# Patient Record
Sex: MALE | Race: OTHER | NOT HISPANIC OR LATINO | URBAN - METROPOLITAN AREA
[De-identification: names, ages, dates, MRNs, and addresses within clinical notes are randomized per-mention and may not be internally consistent; named-entity substitution may affect disease eponyms.]

---

## 2024-06-11 ENCOUNTER — TELEPHONE (OUTPATIENT)
Dept: UROLOGY | Facility: CLINIC | Age: 59
End: 2024-06-11

## 2024-06-11 ENCOUNTER — OFFICE VISIT (OUTPATIENT)
Dept: UROLOGY | Facility: CLINIC | Age: 59
End: 2024-06-11
Payer: COMMERCIAL

## 2024-06-11 VITALS
WEIGHT: 250 LBS | HEIGHT: 70 IN | SYSTOLIC BLOOD PRESSURE: 123 MMHG | BODY MASS INDEX: 35.79 KG/M2 | HEART RATE: 85 BPM | DIASTOLIC BLOOD PRESSURE: 79 MMHG

## 2024-06-11 DIAGNOSIS — N28.89 RENAL MASS: Primary | ICD-10-CM

## 2024-06-11 LAB
BILIRUBIN, UA POC OHS: NEGATIVE
BLOOD, UA POC OHS: ABNORMAL
CLARITY, UA POC OHS: ABNORMAL
COLOR, UA POC OHS: ABNORMAL
GLUCOSE, UA POC OHS: >=1000
KETONES, UA POC OHS: NEGATIVE
LEUKOCYTES, UA POC OHS: NEGATIVE
NITRITE, UA POC OHS: NEGATIVE
PH, UA POC OHS: 5.5
PROTEIN, UA POC OHS: 100
SPECIFIC GRAVITY, UA POC OHS: 1.02
UROBILINOGEN, UA POC OHS: 0.2

## 2024-06-11 PROCEDURE — 99205 OFFICE O/P NEW HI 60 MIN: CPT | Mod: S$GLB,,, | Performed by: UROLOGY

## 2024-06-11 PROCEDURE — 81003 URINALYSIS AUTO W/O SCOPE: CPT | Mod: QW,S$GLB,, | Performed by: UROLOGY

## 2024-06-11 RX ORDER — TIRZEPATIDE 10 MG/.5ML
INJECTION, SOLUTION SUBCUTANEOUS
COMMUNITY
Start: 2024-04-08

## 2024-06-11 RX ORDER — HYDROCODONE BITARTRATE AND ACETAMINOPHEN 7.5; 325 MG/1; MG/1
1 TABLET ORAL EVERY 6 HOURS PRN
Qty: 10 TABLET | Refills: 0 | Status: SHIPPED | OUTPATIENT
Start: 2024-06-11

## 2024-06-11 RX ORDER — CLOPIDOGREL BISULFATE 75 MG/1
75 TABLET ORAL
COMMUNITY
Start: 2024-01-27

## 2024-06-11 RX ORDER — METOPROLOL SUCCINATE 50 MG/1
TABLET, EXTENDED RELEASE ORAL
COMMUNITY
Start: 2024-05-30

## 2024-06-11 RX ORDER — GLIPIZIDE 10 MG/1
10 TABLET ORAL 2 TIMES DAILY
COMMUNITY
Start: 2024-05-17

## 2024-06-11 NOTE — PROGRESS NOTES
Subjective:       Patient ID: Cecil Martinez is a 58 y.o. male.    Chief Complaint: Nephrolithiasis      HPI: 59y/o male with a 2cm left renal stone incidentally found to have a 4cm left renal mass.  Seen at [facility], stent was placed for obstructing stone.  Patient was referred here for further management and evaluation.    Past Medical History:   Past Medical History:   Diagnosis Date    Clotting disorder     Heart attack 2015    Kidney stone     Type 2 diabetes mellitus without complications        Past Surgical Historical:   Past Surgical History:   Procedure Laterality Date    Left URS          Medications:   Medication List with Changes/Refills   Current Medications    CLOPIDOGREL (PLAVIX) 75 MG TABLET    Take 75 mg by mouth.    GLIPIZIDE (GLUCOTROL) 10 MG TABLET    Take 10 mg by mouth 2 (two) times daily.    METOPROLOL SUCCINATE (TOPROL-XL) 50 MG 24 HR TABLET    TAKE ONE TABLET BY MOUTH ONCE DAILY. STOP ATENOLOL    MOUNJARO 10 MG/0.5 ML PNIJ            Past Social History:   Social History     Socioeconomic History    Marital status:    Tobacco Use    Smoking status: Former     Types: Cigarettes    Smokeless tobacco: Former       Allergies: Review of patient's allergies indicates:  No Known Allergies     Family History:   Family History   Problem Relation Name Age of Onset    Cancer Mother          Breast        Review of Systems:  Review of Systems   Constitutional:  Negative for activity change and appetite change.   HENT:  Negative for congestion and dental problem.    Eyes:  Negative for visual disturbance.   Respiratory:  Negative for chest tightness and shortness of breath.    Cardiovascular:  Negative for chest pain.   Gastrointestinal:  Negative for abdominal distention and abdominal pain.   Genitourinary:  Negative for decreased urine volume, difficulty urinating, dysuria, enuresis, flank pain, frequency, genital sores, hematuria, penile discharge, penile pain, penile swelling,  scrotal swelling, testicular pain and urgency.   Musculoskeletal:  Negative for back pain and neck pain.   Skin:  Negative for color change.   Neurological:  Negative for dizziness.   Hematological:  Negative for adenopathy.   Psychiatric/Behavioral:  Negative for agitation, behavioral problems and confusion.        Physical Exam:  Physical Exam  Constitutional:       General: He is not in acute distress.     Appearance: He is well-developed.   HENT:      Head: Normocephalic and atraumatic.      Nose: Nose normal.   Eyes:      General: No scleral icterus.     Conjunctiva/sclera: Conjunctivae normal.      Pupils: Pupils are equal, round, and reactive to light.   Neck:      Thyroid: No thyromegaly.      Trachea: No tracheal deviation.   Cardiovascular:      Rate and Rhythm: Normal rate and regular rhythm.      Heart sounds: Normal heart sounds.   Pulmonary:      Effort: Pulmonary effort is normal. No respiratory distress.      Breath sounds: Normal breath sounds. No wheezing or rales.   Abdominal:      General: Bowel sounds are normal. There is no distension.      Palpations: Abdomen is soft.      Tenderness: There is no abdominal tenderness. There is no guarding or rebound.   Genitourinary:     Penis: Normal. No tenderness.       Prostate: Normal.   Musculoskeletal:         General: No deformity. Normal range of motion.      Cervical back: Neck supple.   Lymphadenopathy:      Cervical: No cervical adenopathy.   Skin:     General: Skin is warm and dry.      Findings: No erythema or rash.   Neurological:      Mental Status: He is alert and oriented to person, place, and time.      Cranial Nerves: No cranial nerve deficit.   Psychiatric:         Behavior: Behavior normal.         Assessment/Plan:       Problem List Items Addressed This Visit    None  Visit Diagnoses       Renal mass    -  Primary               58-year-old male with a 2 cm stone left ureteral stent placed has has a 4 cm left renal mass we had a long  discussion about the options for treatment he would like radical nephrectomy to reduce his risk of bleeding and tumor spread we will proceed to the operating room after gets medical clearance for left hand assist radical nephrectomy

## 2024-06-11 NOTE — TELEPHONE ENCOUNTER
Spoke with pt and informed him I would speak with provider about pain medication.    ----- Message from Idalia Rea sent at 6/11/2024  2:25 PM CDT -----  Contact: Jeanne - girlfriend  Patient is requesting a call back regarding pain medication. Please call back at [phone]

## 2024-06-12 DIAGNOSIS — N28.89 RENAL MASS: Primary | ICD-10-CM

## 2024-06-21 ENCOUNTER — CLINICAL SUPPORT (OUTPATIENT)
Dept: UROLOGY | Facility: CLINIC | Age: 59
End: 2024-06-21
Payer: COMMERCIAL

## 2024-06-21 ENCOUNTER — TELEPHONE (OUTPATIENT)
Dept: UROLOGY | Facility: CLINIC | Age: 59
End: 2024-06-21

## 2024-06-21 DIAGNOSIS — N28.89 RENAL MASS: Primary | ICD-10-CM

## 2024-06-21 RX ORDER — TIRZEPATIDE 12.5 MG/.5ML
12.5 INJECTION, SOLUTION SUBCUTANEOUS WEEKLY
COMMUNITY
Start: 2024-06-16

## 2024-06-21 RX ORDER — DAPAGLIFLOZIN 10 MG/1
10 TABLET, FILM COATED ORAL DAILY
COMMUNITY

## 2024-06-21 RX ORDER — ATORVASTATIN CALCIUM 40 MG/1
40 TABLET, FILM COATED ORAL DAILY
COMMUNITY

## 2024-06-21 RX ORDER — ASPIRIN 325 MG
325 TABLET, DELAYED RELEASE (ENTERIC COATED) ORAL DAILY
COMMUNITY

## 2024-06-21 RX ORDER — LISINOPRIL AND HYDROCHLOROTHIAZIDE 10; 12.5 MG/1; MG/1
1 TABLET ORAL DAILY
COMMUNITY

## 2024-06-21 RX ORDER — MULTIVITAMIN
1 TABLET ORAL DAILY
COMMUNITY

## 2024-06-21 NOTE — TELEPHONE ENCOUNTER
Returned request for call back. Informed Mr. Martinez that the nurse practitioner  that was here today did not feel comfortable sending out any pain medication because she has never seen him as a patient. Instructed patient that he could call back on Monday when Dr. Bay returns from vacation & ask for pain medication refill at that time. Verbalized understanding.                           ----- Message from Sofie Powell sent at 6/21/2024  3:38 PM CDT -----  Name of Who is Calling:pt           What is the request in detail:returning missed call           Can the clinic reply by [facility]:no           What Number to Call Back if not in [facility]: [phone]

## 2024-06-21 NOTE — PROGRESS NOTES
Patient & girlfriend here for left radical nephrectomy & left stent DC education & to sign consents. Educated them on both procedures including risks & alternatives. Pre-op instructions reviewed with patient as well as bowel prep instructions & copies of both given. Copy of pre-op lab orders & updated med list given to patient as well. Copies also faxed to surgery. Questions answered, verbalized understanding & consents signed. Patient inquired about getting another script for pain medication. Instructed him that I would ask & let him know.

## 2024-06-21 NOTE — TELEPHONE ENCOUNTER
Attempted to call patient  to inform him that pain medication would not be called out at this time. No answer, no voicemail to leave message. Will attempt to call again shortly.

## 2024-06-24 LAB
ANION GAP SERPL CALC-SCNC: 8 MMOL/L (ref 3–11)
APPEARANCE, UA: ABNORMAL
APTT PPP: 33.8 SEC (ref 25.2–38.7)
BACTERIA SPEC CULT: ABNORMAL /HPF
BASOPHILS NFR BLD: 0.8 % (ref 0–3)
BILIRUB UR QL STRIP: NEGATIVE MG/DL
BUN SERPL-MCNC: 12 MG/DL (ref 7–18)
BUN/CREAT SERPL: 13.48 RATIO (ref 7–18)
CALCIUM SERPL-MCNC: 8.9 MG/DL (ref 8.8–10.5)
CHLORIDE SERPL-SCNC: 100 MMOL/L (ref 100–108)
CO2 SERPL-SCNC: 27 MMOL/L (ref 21–32)
COLOR UR: ABNORMAL
CREAT SERPL-MCNC: 0.89 MG/DL (ref 0.7–1.3)
EOSINOPHIL NFR BLD: 5 % (ref 1–3)
ERYTHROCYTE [DISTWIDTH] IN BLOOD BY AUTOMATED COUNT: 12.9 % (ref 12.5–18)
GFR ESTIMATION: > 60
GLUCOSE (UA): NORMAL MG/DL
GLUCOSE SERPL-MCNC: 209 MG/DL (ref 70–110)
HCT VFR BLD AUTO: 43.7 % (ref 42–52)
HGB BLD-MCNC: 15.3 G/DL (ref 14–18)
HGB UR QL STRIP: 150 /UL
INR PPP: 1 INR (ref 0.9–1.1)
KETONES UR QL STRIP: NEGATIVE MG/DL
LEUKOCYTE ESTERASE UR QL STRIP: 500 /UL
LYMPHOCYTES NFR BLD: 31.1 % (ref 25–40)
MCH RBC QN AUTO: 30.5 PG (ref 27–31.2)
MCHC RBC AUTO-ENTMCNC: 35 G/DL (ref 31.8–35.4)
MCV RBC AUTO: 87.1 FL (ref 80–97)
MONOCYTES NFR BLD: 7.2 % (ref 1–15)
MUCUS URINE: ABNORMAL /LPF
NEUTROPHILS # BLD AUTO: 5.07 10*3/UL (ref 1.8–7.7)
NEUTROPHILS NFR BLD: 55.7 % (ref 37–80)
NITRITE UR QL STRIP: NEGATIVE
NUCLEATED RED BLOOD CELLS: 0 %
PH UR STRIP: 5 PH (ref 5–9)
PLATELETS: 195 10*3/UL (ref 142–424)
POTASSIUM SERPL-SCNC: 4.4 MMOL/L (ref 3.6–5.2)
PROT UR QL STRIP: 100 MG/DL
PROTHROMBIN TIME: 11.4 SEC (ref 10.4–13.2)
RBC # BLD AUTO: 5.02 10*6/UL (ref 4.7–6.1)
RBC #/AREA URNS HPF: ABNORMAL /HPF (ref 0–2)
SERVICE COMMENT 03: ABNORMAL
SODIUM BLD-SCNC: 135 MMOL/L (ref 135–145)
SP GR UR STRIP: 1.02 (ref 1–1.03)
SPECIMEN COLLECTION METHOD, URINE: ABNORMAL
SQUAMOUS EPITHELIAL, UA: ABNORMAL /LPF
UROBILINOGEN UR STRIP-ACNC: NORMAL MG/DL
WBC # BLD: 9.1 10*3/UL (ref 4.6–10.2)
WBC #/AREA URNS HPF: ABNORMAL /HPF (ref 0–5)

## 2024-06-25 DIAGNOSIS — N28.89 RENAL MASS: ICD-10-CM

## 2024-06-25 RX ORDER — HYDROCODONE BITARTRATE AND ACETAMINOPHEN 7.5; 325 MG/1; MG/1
1 TABLET ORAL EVERY 6 HOURS PRN
Qty: 10 TABLET | Refills: 0 | Status: SHIPPED | OUTPATIENT
Start: 2024-06-25

## 2024-06-25 NOTE — TELEPHONE ENCOUNTER
Spoke with pt in regards to pain medication. Per Corie FAIRBANKS NP verbal order, will send out refill. Pharmacy confirmed. Pt verbalized understanding.             ----- Message from Myla Pierson sent at 6/25/2024  9:04 AM CDT -----  Type:  RX Refill Request    Who Called:  Patient  Refill or New Rx:  Refill  RX Name and Strength:  HYDROcodone-acetaminophen (NORCO) 7.5-325 mg per tablet  How is the patient currently taking it? (ex. 1XDay):  As need it  Is this a 30 day or 90 day RX:  30 Day  Preferred Pharmacy with phone number:     [facility] - [place], [place] - [phone]  [facility] [address]  Phone: [phone] Fax: [phone]      Local or Mail Order: Local  Ordering Provider:  Corie Wilkerson NP  Would the patient rather a call back or a response via [facility]? Call back  Best Call Back Number:  [phone] (home)     Additional Information:

## 2024-06-26 LAB — URINE CULTURE, ROUTINE: NORMAL

## 2024-07-03 ENCOUNTER — OUTSIDE PLACE OF SERVICE (OUTPATIENT)
Dept: UROLOGY | Facility: CLINIC | Age: 59
End: 2024-07-03

## 2024-07-03 RX ORDER — HYDROCODONE BITARTRATE AND ACETAMINOPHEN 7.5; 325 MG/1; MG/1
1 TABLET ORAL EVERY 6 HOURS PRN
Qty: 15 TABLET | Refills: 0 | Status: SHIPPED | OUTPATIENT
Start: 2024-07-03

## 2024-07-03 RX ORDER — CEFDINIR 300 MG/1
300 CAPSULE ORAL 2 TIMES DAILY
Qty: 28 CAPSULE | Refills: 0 | Status: SHIPPED | OUTPATIENT
Start: 2024-07-03 | End: 2024-07-17

## 2024-07-12 ENCOUNTER — TELEPHONE (OUTPATIENT)
Dept: UROLOGY | Facility: CLINIC | Age: 59
End: 2024-07-12
Payer: COMMERCIAL

## 2024-07-12 NOTE — TELEPHONE ENCOUNTER
Contacted histology department at Pathology lab to check on status of pathology from radical Left nephrectomy 07/03/2024. Notified that pathology is still pending, specimen sent out for additional testing.

## 2024-07-19 ENCOUNTER — TELEPHONE (OUTPATIENT)
Dept: UROLOGY | Facility: CLINIC | Age: 59
End: 2024-07-19
Payer: COMMERCIAL

## 2024-07-19 NOTE — TELEPHONE ENCOUNTER
Contacted pt, advised that we have not received his ([place]) paperwork. Suggested that he had it faxed back to the office. Confirmed fax#. Pt verbalized understanding. JENY    ----- Message from Idalia Rea sent at 7/19/2024  9:43 AM CDT -----  Contact: self  Patient is requesting a call back regarding asking if paperwork was filled out to send to his insurance. Please call back at [phone]

## 2024-07-22 ENCOUNTER — TELEPHONE (OUTPATIENT)
Dept: UROLOGY | Facility: CLINIC | Age: 59
End: 2024-07-22
Payer: COMMERCIAL

## 2024-07-22 NOTE — TELEPHONE ENCOUNTER
Spoke with pt and informed him that paperwork has been filled out but there are a couple questions I could not fill out from home. Paperwork was sent back to office and office staff will finish this and fax over.    ----- Message from Sofie Powell sent at 7/22/2024  8:39 AM CDT -----  Name of Who is Calling:pt           What is the request in detail:requesting a call back as he is bringing in the paperwork for workers comp           Can the clinic reply by [facility]:no           What Number to Call Back if not in [facility]: [phone]

## 2024-07-30 ENCOUNTER — OFFICE VISIT (OUTPATIENT)
Dept: UROLOGY | Facility: CLINIC | Age: 59
End: 2024-07-30
Payer: COMMERCIAL

## 2024-07-30 ENCOUNTER — TELEPHONE (OUTPATIENT)
Dept: HEMATOLOGY/ONCOLOGY | Facility: CLINIC | Age: 59
End: 2024-07-30
Payer: COMMERCIAL

## 2024-07-30 VITALS
DIASTOLIC BLOOD PRESSURE: 82 MMHG | HEIGHT: 70 IN | BODY MASS INDEX: 35.87 KG/M2 | SYSTOLIC BLOOD PRESSURE: 128 MMHG | HEART RATE: 96 BPM | OXYGEN SATURATION: 98 %

## 2024-07-30 DIAGNOSIS — C64.2 MALIGNANT NEOPLASM OF LEFT KIDNEY: Primary | ICD-10-CM

## 2024-07-30 PROCEDURE — 1159F MED LIST DOCD IN RCRD: CPT | Mod: CPTII,S$GLB,, | Performed by: UROLOGY

## 2024-07-30 PROCEDURE — 1160F RVW MEDS BY RX/DR IN RCRD: CPT | Mod: CPTII,S$GLB,, | Performed by: UROLOGY

## 2024-07-30 PROCEDURE — 3079F DIAST BP 80-89 MM HG: CPT | Mod: CPTII,S$GLB,, | Performed by: UROLOGY

## 2024-07-30 PROCEDURE — 99024 POSTOP FOLLOW-UP VISIT: CPT | Mod: S$GLB,,, | Performed by: UROLOGY

## 2024-07-30 PROCEDURE — 3074F SYST BP LT 130 MM HG: CPT | Mod: CPTII,S$GLB,, | Performed by: UROLOGY

## 2024-07-30 NOTE — PROGRESS NOTES
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  Patient is doing well margins were negative on path however due to the rhabdoid features in high-grade nature of the tumor we will refer him to Medical Oncology for imaging and further management.  Patient has well healed doing well return to clinic in 1 year

## 2024-07-30 NOTE — TELEPHONE ENCOUNTER
Spoke with the patient regarding referral all questions answered. Appointment date, time, and location provided. TTRN

## 2024-08-02 ENCOUNTER — TELEPHONE (OUTPATIENT)
Dept: UROLOGY | Facility: CLINIC | Age: 59
End: 2024-08-02
Payer: COMMERCIAL

## 2024-08-02 NOTE — TELEPHONE ENCOUNTER
Contacted pt, advised that I will print out path report and have it at left check in desk. Pt verbalized understanding. BJP    ----- Message from Sherry Seo LPN sent at 8/2/2024 12:40 PM CDT -----    ----- Message -----  From: Vandana Lowe  Sent: 8/2/2024   8:47 AM CDT  To: [facility] Staff    States he needs to get some paper work from his surgery on 7/30, results from the removal of his kidney. Please call pt [phone]. Thank you

## 2024-08-08 ENCOUNTER — OFFICE VISIT (OUTPATIENT)
Dept: HEMATOLOGY/ONCOLOGY | Facility: CLINIC | Age: 59
End: 2024-08-08
Payer: COMMERCIAL

## 2024-08-08 ENCOUNTER — TELEPHONE (OUTPATIENT)
Dept: UROLOGY | Facility: CLINIC | Age: 59
End: 2024-08-08
Payer: COMMERCIAL

## 2024-08-08 VITALS
HEIGHT: 70 IN | BODY MASS INDEX: 36.02 KG/M2 | HEART RATE: 84 BPM | WEIGHT: 251.63 LBS | RESPIRATION RATE: 16 BRPM | DIASTOLIC BLOOD PRESSURE: 82 MMHG | OXYGEN SATURATION: 96 % | SYSTOLIC BLOOD PRESSURE: 151 MMHG

## 2024-08-08 DIAGNOSIS — C64.2 MALIGNANT NEOPLASM OF LEFT KIDNEY: ICD-10-CM

## 2024-08-08 DIAGNOSIS — C64.2 RENAL CELL CARCINOMA OF LEFT KIDNEY: Primary | ICD-10-CM

## 2024-08-09 ENCOUNTER — TELEPHONE (OUTPATIENT)
Dept: HEMATOLOGY/ONCOLOGY | Facility: CLINIC | Age: 59
End: 2024-08-09
Payer: COMMERCIAL

## 2024-08-22 ENCOUNTER — OFFICE VISIT (OUTPATIENT)
Dept: HEMATOLOGY/ONCOLOGY | Facility: CLINIC | Age: 59
End: 2024-08-22
Payer: COMMERCIAL

## 2024-08-22 VITALS
HEART RATE: 73 BPM | RESPIRATION RATE: 16 BRPM | SYSTOLIC BLOOD PRESSURE: 145 MMHG | WEIGHT: 258.38 LBS | HEIGHT: 70 IN | OXYGEN SATURATION: 94 % | BODY MASS INDEX: 36.99 KG/M2 | DIASTOLIC BLOOD PRESSURE: 78 MMHG

## 2024-08-22 DIAGNOSIS — I25.10 CORONARY ARTERY DISEASE, UNSPECIFIED VESSEL OR LESION TYPE, UNSPECIFIED WHETHER ANGINA PRESENT, UNSPECIFIED WHETHER NATIVE OR TRANSPLANTED HEART: ICD-10-CM

## 2024-08-22 DIAGNOSIS — I10 HYPERTENSION, UNSPECIFIED TYPE: ICD-10-CM

## 2024-08-22 DIAGNOSIS — E08.00 DIABETES MELLITUS DUE TO UNDERLYING CONDITION WITH HYPEROSMOLARITY WITHOUT COMA, WITHOUT LONG-TERM CURRENT USE OF INSULIN: ICD-10-CM

## 2024-08-22 DIAGNOSIS — C64.2 MALIGNANT NEOPLASM OF LEFT KIDNEY: Primary | ICD-10-CM

## 2024-08-22 LAB
ALBUMIN SERPL BCP-MCNC: 3.3 G/DL (ref 3.4–5)
ALBUMIN/GLOBULIN RATIO: 0.79 RATIO (ref 1.1–1.8)
ALP SERPL-CCNC: 79 U/L (ref 46–116)
ALT SERPL W P-5'-P-CCNC: 30 U/L (ref 12–78)
ANION GAP SERPL CALC-SCNC: 3 MMOL/L (ref 3–11)
AST SERPL-CCNC: 16 U/L (ref 15–37)
BASOPHILS NFR BLD: 1.1 % (ref 0–3)
BILIRUB SERPL-MCNC: 0.3 MG/DL (ref 0–1)
BUN SERPL-MCNC: 20 MG/DL (ref 7–18)
BUN/CREAT SERPL: 15.62 RATIO (ref 7–18)
CALCIUM SERPL-MCNC: 8.4 MG/DL (ref 8.8–10.5)
CHLORIDE SERPL-SCNC: 102 MMOL/L (ref 100–108)
CO2 SERPL-SCNC: 30 MMOL/L (ref 21–32)
CREAT SERPL-MCNC: 1.28 MG/DL (ref 0.7–1.3)
EOSINOPHIL NFR BLD: 5.2 % (ref 1–3)
ERYTHROCYTE [DISTWIDTH] IN BLOOD BY AUTOMATED COUNT: 13.8 % (ref 12.5–18)
GFR ESTIMATION: 57
GLOBULIN: 4.2 G/DL (ref 2.3–3.5)
GLUCOSE SERPL-MCNC: 166 MG/DL (ref 70–110)
HCT VFR BLD AUTO: 41.1 % (ref 42–52)
HGB BLD-MCNC: 13.9 G/DL (ref 14–18)
LYMPHOCYTES NFR BLD: 38.6 % (ref 25–40)
MCH RBC QN AUTO: 30.8 PG (ref 27–31.2)
MCHC RBC AUTO-ENTMCNC: 33.8 G/DL (ref 31.8–35.4)
MCV RBC AUTO: 90.9 FL (ref 80–97)
MONOCYTES NFR BLD: 7.8 % (ref 1–15)
NEUTROPHILS # BLD AUTO: 4.25 10*3/UL (ref 1.8–7.7)
NEUTROPHILS NFR BLD: 47 % (ref 37–80)
NUCLEATED RED BLOOD CELLS: 0 %
PLATELETS: 242 10*3/UL (ref 142–424)
POTASSIUM SERPL-SCNC: 4.4 MMOL/L (ref 3.6–5.2)
PROT SERPL-MCNC: 7.5 G/DL (ref 6.4–8.2)
RBC # BLD AUTO: 4.52 10*6/UL (ref 4.7–6.1)
SODIUM BLD-SCNC: 135 MMOL/L (ref 135–145)
TSH SERPL DL<=0.005 MIU/L-ACNC: 1.27 UIU/ML (ref 0.36–3.74)
WBC # BLD: 9.1 10*3/UL (ref 4.6–10.2)

## 2024-08-22 NOTE — PROGRESS NOTES
MEDICAL ONCOLOGY INITIAL CONSULTATION NOTE    Patient ID: Cecil Martinez is a 58 y.o. male.    Chief Complaint:  Renal cell carcinoma    HPI:  Patient is a 58-year-old male with past medical history of hypertension, coronary artery disease, kidney stone, mixed hyperlipidemia, type 2 diabetes mellitus without complications who recently underwent left nephrectomy which showed findings consistent with clear cell renal cell carcinoma.  Please see detailed pathology report below.  Patient presents to our clinic today for further evaluation reports doing well after recent surgery.       Pathology:  07/22/2024    Left nephrectomy:    Tumor focality: Unifocal  Tumor size: 4.5 cm  Histologic type: Clear renal cell carcinoma  Histologic grade: Grade 4  Tumor extent: Extends into renal sinus  Histologic features: Rhabdoid features present  Tumor necrosis: Not identified  Lymphatic and or vascular invasion: Not identified  Margin status: All margins negative for invasive carcinoma  Pathologic staging: pT3a        Imaging:       MRI abdomen with and without contrast:  05/29/2024    Kidneys enhance symmetrically.  3.9 x 3.1 cm heterogeneously enhancing partially exophytic mass at the anterior aspect left mid pole consistent with malignancy.  Enlarged left para-aortic lymph node measuring 2 x 1.7 cm concerning for regional metastatic disease.    Heterogeneous enhancement in the left renal vein extending into the adjacent IVC which remains throughout the post-contrast acquisitions in his consistent for persistent nonocclusive thrombus.  Left renal artery appears patent.  Left renal stent was reportedly placed in the interval through this is poorly delineated on the current study.    2.3 cm nephrolith at the left UPJ with mild hydronephrosis.  Subcentimeter nonobstructing stone at the left inferior pole.  Subcentimeter simple renal cyst bilaterally.    Impression:  3.9 cm enhancing left renal mass with enlarged periaortic lymph  nodes suggestive of regional metastatic disease.  Nonocclusive thrombus in the left renal vein extending into the adjacent IVC.  Left renal stent was reportedly placed in the interim.  2.3 cm nephrolith at the left UPJ with mild hydronephrosis  Severe hepatic steatosis    Past Medical History:   Diagnosis Date    Clotting disorder     Essential (primary) hypertension     Heart attack 2015    Kidney cancer, primary, with metastasis from kidney to other site     Kidney stone     Mixed hyperlipidemia     Type 2 diabetes mellitus without complications      Family History   Problem Relation Name Age of Onset    Breast cancer Mother      Cancer Mother          Breast    No Known Problems Father      Kidney cancer Brother marjorie      Social History     Socioeconomic History    Marital status:    Tobacco Use    Smoking status: Former     Types: Cigarettes    Smokeless tobacco: Former   Substance and Sexual Activity    Alcohol use: Not Currently    Drug use: Never     Social Determinants of Health     Financial Resource Strain: Medium Risk (8/7/2024)    Overall Financial Resource Strain (CARDIA)     Difficulty of Paying Living Expenses: Somewhat hard   Food Insecurity: Food Insecurity Present (8/7/2024)    Hunger Vital Sign     Worried About Running Out of Food in the Last Year: Sometimes true     Ran Out of Food in the Last Year: Never true   Physical Activity: Inactive (8/7/2024)    Exercise Vital Sign     Days of Exercise per Week: 0 days     Minutes of Exercise per Session: 0 min   Stress: No Stress Concern Present (8/7/2024)    [employer] - Occupational Stress Questionnaire     Feeling of Stress : Not at all   Housing Stability: Unknown (8/7/2024)    Housing Stability Vital Sign     Unable to Pay for Housing in the Last Year: No     Past Surgical History:   Procedure Laterality Date    Left URS      RADICAL NEPHRECTOMY Left     Left-hand assisted laparoscopic    STENT, CAROTID W/  PROTECT      x 2         Review of systems:  Review of Systems   Constitutional:  Negative for activity change, appetite change, chills, diaphoresis, fatigue and unexpected weight change.   HENT:  Negative for congestion, facial swelling, hearing loss, mouth sores, trouble swallowing and voice change.    Eyes:  Negative for photophobia, pain, discharge and itching.   Respiratory:  Negative for apnea, cough, choking, chest tightness and shortness of breath.    Cardiovascular:  Negative for chest pain, palpitations and leg swelling.   Gastrointestinal:  Negative for abdominal distention, abdominal pain, anal bleeding and blood in stool.   Endocrine: Negative for cold intolerance, heat intolerance, polydipsia and polyphagia.   Genitourinary:  Negative for difficulty urinating, dysuria, flank pain and hematuria.   Musculoskeletal:  Negative for arthralgias, back pain, joint swelling, myalgias, neck pain and neck stiffness.   Skin:  Negative for color change, pallor and wound.   Allergic/Immunologic: Negative for environmental allergies, food allergies and immunocompromised state.   Neurological:  Negative for dizziness, seizures, facial asymmetry, speech difficulty, light-headedness, numbness and headaches.   Hematological:  Negative for adenopathy. Does not bruise/bleed easily.   Psychiatric/Behavioral:  Negative for agitation, behavioral problems, confusion, decreased concentration and sleep disturbance.            Physical Exam  Vitals and nursing note reviewed.   Constitutional:       General: He is not in acute distress.     Appearance: Normal appearance. He is not ill-appearing.   HENT:      Head: Normocephalic and atraumatic.      Nose: No congestion or rhinorrhea.   Eyes:      General: No scleral icterus.     Extraocular Movements: Extraocular movements intact.      Pupils: Pupils are equal, round, and reactive to light.   Cardiovascular:      Rate and Rhythm: Normal rate and regular rhythm.      Pulses: Normal  pulses.      Heart sounds: Normal heart sounds. No murmur heard.     No gallop.   Pulmonary:      Effort: Pulmonary effort is normal. No respiratory distress.      Breath sounds: Normal breath sounds. No stridor. No wheezing or rhonchi.   Abdominal:      General: Bowel sounds are normal. There is no distension.      Palpations: There is no mass.      Tenderness: There is no abdominal tenderness. There is no guarding.   Musculoskeletal:         General: No swelling, tenderness, deformity or signs of injury. Normal range of motion.      Cervical back: Normal range of motion and neck supple. No rigidity. No muscular tenderness.      Right lower leg: No edema.      Left lower leg: No edema.   Skin:     General: Skin is warm.      Coloration: Skin is not jaundiced or pale.      Findings: No bruising or lesion.   Neurological:      General: No focal deficit present.      Mental Status: He is alert and oriented to person, place, and time.      Cranial Nerves: No cranial nerve deficit.      Sensory: No sensory deficit.      Motor: No weakness.      Gait: Gait normal.   Psychiatric:         Mood and Affect: Mood normal.         Behavior: Behavior normal.         Thought Content: Thought content normal.     There were no vitals filed for this visit.  There is no height or weight on file to calculate BSA.    Assessment/Plan:      ECOG 1      Clear renal cell carcinoma arising from the left kidney:    == Status post left simple nephrectomy, pT3a with regional lymph node involvement on imaging but no distant metastatic disease.  Based on current guidelines for stage III clear renal cell carcinoma status post simple mastectomy, patient would need adjuvant pembrolizumab which is category 1 per NCCN guidelines.  In this regard I will recommend port placement and prior authorization request for adjuvant pembrolizumab.  ==08/22/2024:  Patient here today to discuss upcoming chemotherapy/immunotherapy. An extensive discussion was had  which included a thorough discussion of potential side effect profile, risk versus benefits, and expected outcomes.  Risks, including but not limited to, possible hair loss, bone marrow damage, damage to body organs, allergic reactions, sterility, nausea/vomiting, constipation/diarrhea, sores in the mouth, secondary cancers, and rarely death were all discuss.  Specific side effects pertaining to their chemotherapy/immunotherapy medications were discussed as well.  The patient agrees with the plan and all questions and their support systems questions have been answered to their satisfaction.  Premedications were prescribed and patient was educated on appropriate usage.  Patient was educated on when to call, and given the number to call, or to notify the provider including but not limited to:  Persistent nausea and vomiting, dehydration, fever greater than 100.4 lasting over 1 hour induration or isolated feeders greater than 101, rash while on active chemotherapy or immunotherapy, severe pain or new onset pain not controlled by current pain medication regimen.      2. HTN:    == Elevated. Continue current medications and follow up closely.       3. CAD:     == Stable. Continue to follow up with PCP    4. DM:    == On Glipizide    Plan:  Adjuvant keytruda for 1 year - start Monday 8/26/2024, 6 week dosing  Cbc cmp tsh today and every 6 weeks prior to keytruda  Rtc on 10/7/2024       Total time spent in counseling and discussion about further management options including relevant lab work, treatment,  prognosis, medications and intended side effects was more than 60 minutes. More than 50% of the time was spent on counseling and coordination of care.  I spent a total of 60 minutes on the day of the visit.This includes face to face time and non-face to face time preparing to see the patient (eg, review of tests), Obtaining and/or reviewing separately obtained history, Documenting clinical information in the electronic or  other health record, Independently interpreting resultsand communicating results to the patient/family/caregiver, or Care coordination.

## 2024-08-26 RX ORDER — PROCHLORPERAZINE EDISYLATE 5 MG/ML
10 INJECTION INTRAMUSCULAR; INTRAVENOUS ONCE AS NEEDED
OUTPATIENT
Start: 2024-08-26

## 2024-08-26 RX ORDER — HEPARIN 100 UNIT/ML
500 SYRINGE INTRAVENOUS
OUTPATIENT
Start: 2024-08-26

## 2024-08-26 RX ORDER — SODIUM CHLORIDE 0.9 % (FLUSH) 0.9 %
10 SYRINGE (ML) INJECTION
OUTPATIENT
Start: 2024-08-26

## 2024-08-26 RX ORDER — DIPHENHYDRAMINE HYDROCHLORIDE 50 MG/ML
50 INJECTION INTRAMUSCULAR; INTRAVENOUS ONCE AS NEEDED
OUTPATIENT
Start: 2024-08-26

## 2024-08-26 RX ORDER — EPINEPHRINE 0.3 MG/.3ML
0.3 INJECTION SUBCUTANEOUS ONCE AS NEEDED
OUTPATIENT
Start: 2024-08-26

## 2024-10-04 LAB
ALBUMIN SERPL BCP-MCNC: 2.9 G/DL (ref 3.4–5)
ALBUMIN/GLOBULIN RATIO: 0.57 RATIO (ref 1.1–1.8)
ALP SERPL-CCNC: 77 U/L (ref 46–116)
ALT SERPL W P-5'-P-CCNC: 27 U/L (ref 12–78)
ANION GAP SERPL CALC-SCNC: 8 MMOL/L (ref 3–11)
AST SERPL-CCNC: 15 U/L (ref 15–37)
BASOPHILS NFR BLD: 0.9 % (ref 0–3)
BILIRUB SERPL-MCNC: 0.3 MG/DL (ref 0–1)
BUN SERPL-MCNC: 11 MG/DL (ref 7–18)
BUN/CREAT SERPL: 9.01 RATIO (ref 7–18)
CALCIUM SERPL-MCNC: 9.3 MG/DL (ref 8.8–10.5)
CHLORIDE SERPL-SCNC: 102 MMOL/L (ref 100–108)
CO2 SERPL-SCNC: 25 MMOL/L (ref 21–32)
CREAT SERPL-MCNC: 1.22 MG/DL (ref 0.7–1.3)
EOSINOPHIL NFR BLD: 4.5 % (ref 1–3)
ERYTHROCYTE [DISTWIDTH] IN BLOOD BY AUTOMATED COUNT: 12.9 % (ref 12.5–18)
GFR ESTIMATION: 60
GLOBULIN: 5.1 G/DL (ref 2.3–3.5)
GLUCOSE SERPL-MCNC: 195 MG/DL (ref 70–110)
HCT VFR BLD AUTO: 41.2 % (ref 42–52)
HGB BLD-MCNC: 14.3 G/DL (ref 14–18)
LYMPHOCYTES NFR BLD: 35.8 % (ref 25–40)
MCH RBC QN AUTO: 30.6 PG (ref 27–31.2)
MCHC RBC AUTO-ENTMCNC: 34.7 G/DL (ref 31.8–35.4)
MCV RBC AUTO: 88.2 FL (ref 80–97)
MONOCYTES NFR BLD: 9.7 % (ref 1–15)
NEUTROPHILS # BLD AUTO: 3.68 10*3/UL (ref 1.8–7.7)
NEUTROPHILS NFR BLD: 48.8 % (ref 37–80)
NUCLEATED RED BLOOD CELLS: 0 %
PLATELETS: 227 10*3/UL (ref 142–424)
POTASSIUM SERPL-SCNC: 4.1 MMOL/L (ref 3.6–5.2)
PROT SERPL-MCNC: 8 G/DL (ref 6.4–8.2)
RBC # BLD AUTO: 4.67 10*6/UL (ref 4.7–6.1)
SODIUM BLD-SCNC: 135 MMOL/L (ref 135–145)
TSH SERPL DL<=0.005 MIU/L-ACNC: 0.01 UIU/ML (ref 0.36–3.74)
WBC # BLD: 7.5 10*3/UL (ref 4.6–10.2)

## 2024-10-07 ENCOUNTER — OFFICE VISIT (OUTPATIENT)
Dept: HEMATOLOGY/ONCOLOGY | Facility: CLINIC | Age: 59
End: 2024-10-07
Payer: COMMERCIAL

## 2024-10-07 VITALS
WEIGHT: 248.81 LBS | HEIGHT: 70 IN | OXYGEN SATURATION: 94 % | RESPIRATION RATE: 16 BRPM | SYSTOLIC BLOOD PRESSURE: 137 MMHG | HEART RATE: 94 BPM | TEMPERATURE: 98 F | DIASTOLIC BLOOD PRESSURE: 96 MMHG | BODY MASS INDEX: 35.62 KG/M2

## 2024-10-07 DIAGNOSIS — C64.2 MALIGNANT NEOPLASM OF LEFT KIDNEY: Primary | ICD-10-CM

## 2024-10-07 DIAGNOSIS — I25.10 CORONARY ARTERY DISEASE, UNSPECIFIED VESSEL OR LESION TYPE, UNSPECIFIED WHETHER ANGINA PRESENT, UNSPECIFIED WHETHER NATIVE OR TRANSPLANTED HEART: ICD-10-CM

## 2024-10-07 DIAGNOSIS — C64.2 RENAL CELL CARCINOMA OF LEFT KIDNEY: ICD-10-CM

## 2024-10-07 DIAGNOSIS — E08.00 DIABETES MELLITUS DUE TO UNDERLYING CONDITION WITH HYPEROSMOLARITY WITHOUT COMA, WITHOUT LONG-TERM CURRENT USE OF INSULIN: ICD-10-CM

## 2024-10-07 DIAGNOSIS — I10 HYPERTENSION, UNSPECIFIED TYPE: ICD-10-CM

## 2024-10-07 PROCEDURE — 3075F SYST BP GE 130 - 139MM HG: CPT | Mod: CPTII,,, | Performed by: NURSE PRACTITIONER

## 2024-10-07 PROCEDURE — G2211 COMPLEX E/M VISIT ADD ON: HCPCS | Mod: S$PBB,,, | Performed by: NURSE PRACTITIONER

## 2024-10-07 PROCEDURE — 1159F MED LIST DOCD IN RCRD: CPT | Mod: CPTII,,, | Performed by: NURSE PRACTITIONER

## 2024-10-07 PROCEDURE — 3008F BODY MASS INDEX DOCD: CPT | Mod: CPTII,,, | Performed by: NURSE PRACTITIONER

## 2024-10-07 PROCEDURE — 99214 OFFICE O/P EST MOD 30 MIN: CPT | Mod: S$PBB,,, | Performed by: NURSE PRACTITIONER

## 2024-10-07 PROCEDURE — 4010F ACE/ARB THERAPY RXD/TAKEN: CPT | Mod: CPTII,,, | Performed by: NURSE PRACTITIONER

## 2024-10-07 PROCEDURE — 3080F DIAST BP >= 90 MM HG: CPT | Mod: CPTII,,, | Performed by: NURSE PRACTITIONER

## 2024-10-07 RX ORDER — SODIUM CHLORIDE 0.9 % (FLUSH) 0.9 %
10 SYRINGE (ML) INJECTION
Status: CANCELLED | OUTPATIENT
Start: 2024-10-07

## 2024-10-07 RX ORDER — DIPHENHYDRAMINE HYDROCHLORIDE 50 MG/ML
50 INJECTION INTRAMUSCULAR; INTRAVENOUS ONCE AS NEEDED
Status: CANCELLED | OUTPATIENT
Start: 2024-10-07

## 2024-10-07 RX ORDER — PROCHLORPERAZINE EDISYLATE 5 MG/ML
10 INJECTION INTRAMUSCULAR; INTRAVENOUS ONCE AS NEEDED
Status: CANCELLED | OUTPATIENT
Start: 2024-10-07

## 2024-10-07 RX ORDER — HEPARIN 100 UNIT/ML
500 SYRINGE INTRAVENOUS
Status: CANCELLED | OUTPATIENT
Start: 2024-10-07

## 2024-10-07 RX ORDER — EPINEPHRINE 0.3 MG/.3ML
0.3 INJECTION SUBCUTANEOUS ONCE AS NEEDED
Status: CANCELLED | OUTPATIENT
Start: 2024-10-07

## 2024-10-07 NOTE — PROGRESS NOTES
MEDICAL ONCOLOGY INITIAL CONSULTATION NOTE    Patient ID: Cecil Martinez is a 59 y.o. male.    Chief Complaint:  Renal cell carcinoma    HPI:  Patient is a 58-year-old male with past medical history of hypertension, coronary artery disease, kidney stone, mixed hyperlipidemia, type 2 diabetes mellitus without complications who recently underwent left nephrectomy which showed findings consistent with clear cell renal cell carcinoma.  Please see detailed pathology report below.  Patient presents to our clinic today for further evaluation reports doing well after recent surgery.       Pathology:  07/22/2024    Left nephrectomy:    Tumor focality: Unifocal  Tumor size: 4.5 cm  Histologic type: Clear renal cell carcinoma  Histologic grade: Grade 4  Tumor extent: Extends into renal sinus  Histologic features: Rhabdoid features present  Tumor necrosis: Not identified  Lymphatic and or vascular invasion: Not identified  Margin status: All margins negative for invasive carcinoma  Pathologic staging: pT3a        Imaging:       MRI abdomen with and without contrast:  05/29/2024    Kidneys enhance symmetrically.  3.9 x 3.1 cm heterogeneously enhancing partially exophytic mass at the anterior aspect left mid pole consistent with malignancy.  Enlarged left para-aortic lymph node measuring 2 x 1.7 cm concerning for regional metastatic disease.    Heterogeneous enhancement in the left renal vein extending into the adjacent IVC which remains throughout the post-contrast acquisitions in his consistent for persistent nonocclusive thrombus.  Left renal artery appears patent.  Left renal stent was reportedly placed in the interval through this is poorly delineated on the current study.    2.3 cm nephrolith at the left UPJ with mild hydronephrosis.  Subcentimeter nonobstructing stone at the left inferior pole.  Subcentimeter simple renal cyst bilaterally.    Impression:  3.9 cm enhancing left renal mass with enlarged periaortic lymph  nodes suggestive of regional metastatic disease.  Nonocclusive thrombus in the left renal vein extending into the adjacent IVC.  Left renal stent was reportedly placed in the interim.  2.3 cm nephrolith at the left UPJ with mild hydronephrosis  Severe hepatic steatosis    Past Medical History:   Diagnosis Date    Clotting disorder     Essential (primary) hypertension     Heart attack 2015    Kidney cancer, primary, with metastasis from kidney to other site     Kidney stone     Mixed hyperlipidemia     Type 2 diabetes mellitus without complications      Family History   Problem Relation Name Age of Onset    Breast cancer Mother      Cancer Mother          Breast    No Known Problems Father      Kidney cancer Brother marjorie      Social History     Socioeconomic History    Marital status:    Tobacco Use    Smoking status: Former     Types: Cigarettes    Smokeless tobacco: Former   Substance and Sexual Activity    Alcohol use: Not Currently    Drug use: Never     Social Drivers of Health     Financial Resource Strain: Medium Risk (8/7/2024)    Overall Financial Resource Strain (CARDIA)     Difficulty of Paying Living Expenses: Somewhat hard   Food Insecurity: Food Insecurity Present (8/7/2024)    Hunger Vital Sign     Worried About Running Out of Food in the Last Year: Sometimes true     Ran Out of Food in the Last Year: Never true   Physical Activity: Inactive (8/7/2024)    Exercise Vital Sign     Days of Exercise per Week: 0 days     Minutes of Exercise per Session: 0 min   Stress: No Stress Concern Present (8/7/2024)    [place] [employer] - Occupational Stress Questionnaire     Feeling of Stress : Not at all   Housing Stability: Unknown (8/7/2024)    Housing Stability Vital Sign     Unable to Pay for Housing in the Last Year: No     Past Surgical History:   Procedure Laterality Date    Left URS      RADICAL NEPHRECTOMY Left     Left-hand assisted laparoscopic    STENT, CAROTID W/ PROTECT       x 2         Review of systems:  Review of Systems   Constitutional:  Negative for activity change, appetite change, chills, diaphoresis, fatigue and unexpected weight change.   HENT:  Negative for congestion, facial swelling, hearing loss, mouth sores, trouble swallowing and voice change.    Eyes:  Negative for photophobia, pain, discharge and itching.   Respiratory:  Negative for apnea, cough, choking, chest tightness and shortness of breath.    Cardiovascular:  Negative for chest pain, palpitations and leg swelling.   Gastrointestinal:  Negative for abdominal distention, abdominal pain, anal bleeding and blood in stool.   Endocrine: Negative for cold intolerance, heat intolerance, polydipsia and polyphagia.   Genitourinary:  Negative for difficulty urinating, dysuria, flank pain and hematuria.   Musculoskeletal:  Negative for arthralgias, back pain, joint swelling, myalgias, neck pain and neck stiffness.   Skin:  Negative for color change, pallor and wound.   Allergic/Immunologic: Negative for environmental allergies, food allergies and immunocompromised state.   Neurological:  Negative for dizziness, seizures, facial asymmetry, speech difficulty, light-headedness, numbness and headaches.   Hematological:  Negative for adenopathy. Does not bruise/bleed easily.   Psychiatric/Behavioral:  Negative for agitation, behavioral problems, confusion, decreased concentration and sleep disturbance.            Physical Exam  Vitals and nursing note reviewed.   Constitutional:       General: He is not in acute distress.     Appearance: Normal appearance. He is not ill-appearing.   HENT:      Head: Normocephalic and atraumatic.      Nose: No congestion or rhinorrhea.   Eyes:      General: No scleral icterus.     Extraocular Movements: Extraocular movements intact.      Pupils: Pupils are equal, round, and reactive to light.   Cardiovascular:      Rate and Rhythm: Normal rate and regular rhythm.      Pulses: Normal pulses.       Heart sounds: Normal heart sounds. No murmur heard.     No gallop.   Pulmonary:      Effort: Pulmonary effort is normal. No respiratory distress.      Breath sounds: Normal breath sounds. No stridor. No wheezing or rhonchi.   Abdominal:      General: Bowel sounds are normal. There is no distension.      Palpations: There is no mass.      Tenderness: There is no abdominal tenderness. There is no guarding.   Musculoskeletal:         General: No swelling, tenderness, deformity or signs of injury. Normal range of motion.      Cervical back: Normal range of motion and neck supple. No rigidity. No muscular tenderness.      Right lower leg: No edema.      Left lower leg: No edema.   Skin:     General: Skin is warm.      Coloration: Skin is not jaundiced or pale.      Findings: No bruising or lesion.   Neurological:      General: No focal deficit present.      Mental Status: He is alert and oriented to person, place, and time.      Cranial Nerves: No cranial nerve deficit.      Sensory: No sensory deficit.      Motor: No weakness.      Gait: Gait normal.   Psychiatric:         Mood and Affect: Mood normal.         Behavior: Behavior normal.         Thought Content: Thought content normal.     Vitals:    10/07/24 1001   BP: (!) 137/96   Pulse: 94   Resp: 16   Temp: 98.2 °F (36.8 °C)     Body surface area is 2.36 meters squared.    Assessment/Plan:      ECOG 1      Clear renal cell carcinoma arising from the left kidney:    == Status post left simple nephrectomy, pT3a with regional lymph node involvement on imaging but no distant metastatic disease.  Based on current guidelines for stage III clear renal cell carcinoma status post simple mastectomy, patient would need adjuvant pembrolizumab which is category 1 per NCCN guidelines.  In this regard I will recommend port placement and prior authorization request for adjuvant pembrolizumab.  ==08/22/2024:  Patient here today to discuss upcoming chemotherapy/immunotherapy. An  extensive discussion was had which included a thorough discussion of potential side effect profile, risk versus benefits, and expected outcomes.  Risks, including but not limited to, possible hair loss, bone marrow damage, damage to body organs, allergic reactions, sterility, nausea/vomiting, constipation/diarrhea, sores in the mouth, secondary cancers, and rarely death were all discuss.  Specific side effects pertaining to their chemotherapy/immunotherapy medications were discussed as well.  The patient agrees with the plan and all questions and their support systems questions have been answered to their satisfaction.  Premedications were prescribed and patient was educated on appropriate usage.  Patient was educated on when to call, and given the number to call, or to notify the provider including but not limited to:  Persistent nausea and vomiting, dehydration, fever greater than 100.4 lasting over 1 hour induration or isolated feeders greater than 101, rash while on active chemotherapy or immunotherapy, severe pain or new onset pain not controlled by current pain medication regimen.  ==10/07/2024: Tolerating Keytruda well, glucose 195, pt has DMII and was not fasting, had coffee with sugar prior to lab draw, no complaints, labs reviewed, pt cleared for Keytruda      2. HTN:    == Elevated. Continue current medications and follow up closely.       3. CAD:     == Stable. Continue to follow up with PCP    4. DM:    == On Glipizide    Plan:  Adjuvant keytruda for 1 year - start Monday 8/26/2024, 6 week dosing  RTC 6 weeks cbc cmp tsh prior    Total time spent in counseling and discussion about further management options including relevant lab work, treatment,  prognosis, medications and intended side effects was more than 30 minutes. More than 50% of the time was spent on counseling and coordination of care.  I spent a total of 30 minutes on the day of the visit.This includes face to face time and non-face to face  time preparing to see the patient (eg, review of tests), Obtaining and/or reviewing separately obtained history, Documenting clinical information in the electronic or other health record, Independently interpreting resultsand communicating results to the patient/family/caregiver, or Care coordination.

## 2024-10-21 ENCOUNTER — TELEPHONE (OUTPATIENT)
Dept: HEMATOLOGY/ONCOLOGY | Facility: CLINIC | Age: 59
End: 2024-10-21
Payer: COMMERCIAL

## 2024-10-21 NOTE — TELEPHONE ENCOUNTER
----- Message from Myla sent at 10/21/2024  2:04 PM CDT -----  Patient wife is calling in regards to would like a call back at [phone]. Patient is currently in hospital due to heart attack...

## 2024-11-08 NOTE — PROGRESS NOTES
MEDICAL ONCOLOGY INITIAL CONSULTATION NOTE    Patient ID: Cecil Martinez is a 59 y.o. male.    Chief Complaint:  Renal cell carcinoma    HPI:  Patient is a 58-year-old male with past medical history of hypertension, coronary artery disease, kidney stone, mixed hyperlipidemia, type 2 diabetes mellitus without complications who recently underwent left nephrectomy which showed findings consistent with clear cell renal cell carcinoma.  Please see detailed pathology report below.  Patient presents to our clinic today for further evaluation reports doing well after recent surgery.       Pathology:  07/22/2024    Left nephrectomy:    Tumor focality: Unifocal  Tumor size: 4.5 cm  Histologic type: Clear renal cell carcinoma  Histologic grade: Grade 4  Tumor extent: Extends into renal sinus  Histologic features: Rhabdoid features present  Tumor necrosis: Not identified  Lymphatic and or vascular invasion: Not identified  Margin status: All margins negative for invasive carcinoma  Pathologic staging: pT3a        Imaging:       MRI abdomen with and without contrast:  05/29/2024    Kidneys enhance symmetrically.  3.9 x 3.1 cm heterogeneously enhancing partially exophytic mass at the anterior aspect left mid pole consistent with malignancy.  Enlarged left para-aortic lymph node measuring 2 x 1.7 cm concerning for regional metastatic disease.    Heterogeneous enhancement in the left renal vein extending into the adjacent IVC which remains throughout the post-contrast acquisitions in his consistent for persistent nonocclusive thrombus.  Left renal artery appears patent.  Left renal stent was reportedly placed in the interval through this is poorly delineated on the current study.    2.3 cm nephrolith at the left UPJ with mild hydronephrosis.  Subcentimeter nonobstructing stone at the left inferior pole.  Subcentimeter simple renal cyst bilaterally.    Impression:  3.9 cm enhancing left renal mass with enlarged periaortic lymph  nodes suggestive of regional metastatic disease.  Nonocclusive thrombus in the left renal vein extending into the adjacent IVC.  Left renal stent was reportedly placed in the interim.  2.3 cm nephrolith at the left UPJ with mild hydronephrosis  Severe hepatic steatosis    Past Medical History:   Diagnosis Date    Clotting disorder     Essential (primary) hypertension     Heart attack 2015    Kidney cancer, primary, with metastasis from kidney to other site     Kidney stone     Mixed hyperlipidemia     Type 2 diabetes mellitus without complications      Family History   Problem Relation Name Age of Onset    Breast cancer Mother      Cancer Mother          Breast    No Known Problems Father      Kidney cancer Brother marjorie      Social History     Socioeconomic History    Marital status:    Tobacco Use    Smoking status: Former     Types: Cigarettes    Smokeless tobacco: Former   Substance and Sexual Activity    Alcohol use: Not Currently    Drug use: Never     Social Drivers of Health     Financial Resource Strain: Medium Risk (8/7/2024)    Overall Financial Resource Strain (CARDIA)     Difficulty of Paying Living Expenses: Somewhat hard   Food Insecurity: Food Insecurity Present (8/7/2024)    Hunger Vital Sign     Worried About Running Out of Food in the Last Year: Sometimes true     Ran Out of Food in the Last Year: Never true   Physical Activity: Inactive (8/7/2024)    Exercise Vital Sign     Days of Exercise per Week: 0 days     Minutes of Exercise per Session: 0 min   Stress: No Stress Concern Present (8/7/2024)    [employer] [place] [employer] - Occupational Stress Questionnaire     Feeling of Stress : Not at all   Housing Stability: Unknown (8/7/2024)    Housing Stability Vital Sign     Unable to Pay for Housing in the Last Year: No     Past Surgical History:   Procedure Laterality Date    Left URS      RADICAL NEPHRECTOMY Left     Left-hand assisted laparoscopic    STENT, CAROTID W/ PROTECT       x 2         Review of systems:  Review of Systems   Constitutional:  Negative for activity change, appetite change, chills, diaphoresis, fatigue and unexpected weight change.   HENT:  Negative for congestion, facial swelling, hearing loss, mouth sores, trouble swallowing and voice change.    Eyes:  Negative for photophobia, pain, discharge and itching.   Respiratory:  Negative for apnea, cough, choking, chest tightness and shortness of breath.    Cardiovascular:  Negative for chest pain, palpitations and leg swelling.   Gastrointestinal:  Negative for abdominal distention, abdominal pain, anal bleeding and blood in stool.   Endocrine: Negative for cold intolerance, heat intolerance, polydipsia and polyphagia.   Genitourinary:  Negative for difficulty urinating, dysuria, flank pain and hematuria.   Musculoskeletal:  Negative for arthralgias, back pain, joint swelling, myalgias, neck pain and neck stiffness.   Skin:  Negative for color change, pallor and wound.   Allergic/Immunologic: Negative for environmental allergies, food allergies and immunocompromised state.   Neurological:  Negative for dizziness, seizures, facial asymmetry, speech difficulty, light-headedness, numbness and headaches.   Hematological:  Negative for adenopathy. Does not bruise/bleed easily.   Psychiatric/Behavioral:  Negative for agitation, behavioral problems, confusion, decreased concentration and sleep disturbance.            Physical Exam  Vitals and nursing note reviewed.   Constitutional:       General: He is not in acute distress.     Appearance: Normal appearance. He is not ill-appearing.   HENT:      Head: Normocephalic and atraumatic.      Nose: No congestion or rhinorrhea.   Eyes:      General: No scleral icterus.     Extraocular Movements: Extraocular movements intact.      Pupils: Pupils are equal, round, and reactive to light.   Cardiovascular:      Rate and Rhythm: Normal rate and regular rhythm.      Pulses: Normal pulses.       Heart sounds: Normal heart sounds. No murmur heard.     No gallop.   Pulmonary:      Effort: Pulmonary effort is normal. No respiratory distress.      Breath sounds: Normal breath sounds. No stridor. No wheezing or rhonchi.   Abdominal:      General: Bowel sounds are normal. There is no distension.      Palpations: There is no mass.      Tenderness: There is no abdominal tenderness. There is no guarding.   Musculoskeletal:         General: No swelling, tenderness, deformity or signs of injury. Normal range of motion.      Cervical back: Normal range of motion and neck supple. No rigidity. No muscular tenderness.      Right lower leg: No edema.      Left lower leg: No edema.   Skin:     General: Skin is warm.      Coloration: Skin is not jaundiced or pale.      Findings: No bruising or lesion.   Neurological:      General: No focal deficit present.      Mental Status: He is alert and oriented to person, place, and time.      Cranial Nerves: No cranial nerve deficit.      Sensory: No sensory deficit.      Motor: No weakness.      Gait: Gait normal.   Psychiatric:         Mood and Affect: Mood normal.         Behavior: Behavior normal.         Thought Content: Thought content normal.     There were no vitals filed for this visit.    There is no height or weight on file to calculate BSA.    Assessment/Plan:      ECOG 1      Clear renal cell carcinoma arising from the left kidney:    == Status post left simple nephrectomy, pT3a with regional lymph node involvement on imaging but no distant metastatic disease.  Based on current guidelines for stage III clear renal cell carcinoma status post simple mastectomy, patient would need adjuvant pembrolizumab which is category 1 per NCCN guidelines.  In this regard I will recommend port placement and prior authorization request for adjuvant pembrolizumab.  ==08/22/2024:  Patient here today to discuss upcoming chemotherapy/immunotherapy. An extensive discussion was had which  included a thorough discussion of potential side effect profile, risk versus benefits, and expected outcomes.  Risks, including but not limited to, possible hair loss, bone marrow damage, damage to body organs, allergic reactions, sterility, nausea/vomiting, constipation/diarrhea, sores in the mouth, secondary cancers, and rarely death were all discuss.  Specific side effects pertaining to their chemotherapy/immunotherapy medications were discussed as well.  The patient agrees with the plan and all questions and their support systems questions have been answered to their satisfaction.  Premedications were prescribed and patient was educated on appropriate usage.  Patient was educated on when to call, and given the number to call, or to notify the provider including but not limited to:  Persistent nausea and vomiting, dehydration, fever greater than 100.4 lasting over 1 hour induration or isolated feeders greater than 101, rash while on active chemotherapy or immunotherapy, severe pain or new onset pain not controlled by current pain medication regimen.  ==10/07/2024: Tolerating Keytruda well, glucose 195, pt has DMII and was not fasting, had coffee with sugar prior to lab draw, no complaints, labs reviewed, pt cleared for Keytruda  ==11/18/2024: TSH elevated, has always been low in past, will recheck and if again elevated will start on synthroid. He had cabg 3 weeks ago, reports doing well, no s/s infection or complications labs reviewed, pt is cleared for keytruda      2. HTN:    == Elevated. Continue current medications and follow up closely.       3. CAD:     == Stable. Continue to follow up with PCP    4. DM:    == On Glipizide    Plan:  Adjuvant keytruda for 1 year - start Monday 8/26/2024, 6 week dosing  RTC 6 weeks cbc cmp tsh prior    Total time spent in counseling and discussion about further management options including relevant lab work, treatment,  prognosis, medications and intended side effects was  more than 30 minutes. More than 50% of the time was spent on counseling and coordination of care.  I spent a total of 30 minutes on the day of the visit.This includes face to face time and non-face to face time preparing to see the patient (eg, review of tests), Obtaining and/or reviewing separately obtained history, Documenting clinical information in the electronic or other health record, Independently interpreting resultsand communicating results to the patient/family/caregiver, or Care coordination.

## 2024-11-15 LAB
ALBUMIN SERPL BCP-MCNC: 3.1 G/DL (ref 3.4–5)
ALBUMIN/GLOBULIN RATIO: 0.58 RATIO (ref 1.1–1.8)
ALP SERPL-CCNC: 100 U/L (ref 46–116)
ALT SERPL W P-5'-P-CCNC: 18 U/L (ref 12–78)
ANION GAP SERPL CALC-SCNC: 10 MMOL/L (ref 3–11)
AST SERPL-CCNC: 14 U/L (ref 15–37)
BASOPHILS NFR BLD: 1.1 % (ref 0–3)
BILIRUB SERPL-MCNC: 0.4 MG/DL (ref 0–1)
BUN SERPL-MCNC: 15 MG/DL (ref 7–18)
BUN/CREAT SERPL: 10.56 RATIO (ref 7–18)
CALCIUM SERPL-MCNC: 9.4 MG/DL (ref 8.8–10.5)
CHLORIDE SERPL-SCNC: 101 MMOL/L (ref 100–108)
CO2 SERPL-SCNC: 24 MMOL/L (ref 21–32)
CREAT SERPL-MCNC: 1.42 MG/DL (ref 0.7–1.3)
EOSINOPHIL NFR BLD: 5.8 % (ref 1–3)
ERYTHROCYTE [DISTWIDTH] IN BLOOD BY AUTOMATED COUNT: 14.3 % (ref 12.5–18)
GFR ESTIMATION: 56
GLOBULIN: 5.3 G/DL (ref 2.3–3.5)
GLUCOSE SERPL-MCNC: 154 MG/DL (ref 70–110)
HCT VFR BLD AUTO: 39.6 % (ref 42–52)
HGB BLD-MCNC: 12.9 G/DL (ref 14–18)
LYMPHOCYTES NFR BLD: 24.1 % (ref 25–40)
MCH RBC QN AUTO: 28.7 PG (ref 27–31.2)
MCHC RBC AUTO-ENTMCNC: 32.6 G/DL (ref 31.8–35.4)
MCV RBC AUTO: 88.2 FL (ref 80–97)
MONOCYTES NFR BLD: 6.5 % (ref 1–15)
NEUTROPHILS # BLD AUTO: 6.21 10*3/UL (ref 1.8–7.7)
NEUTROPHILS NFR BLD: 61.9 % (ref 37–80)
NUCLEATED RED BLOOD CELLS: 0 %
PLATELETS: 445 10*3/UL (ref 142–424)
POTASSIUM SERPL-SCNC: 4.5 MMOL/L (ref 3.6–5.2)
PROT SERPL-MCNC: 8.4 G/DL (ref 6.4–8.2)
RBC # BLD AUTO: 4.49 10*6/UL (ref 4.7–6.1)
SODIUM BLD-SCNC: 135 MMOL/L (ref 135–145)
TSH SERPL DL<=0.005 MIU/L-ACNC: 13.74 UIU/ML (ref 0.36–3.74)
WBC # BLD: 10 10*3/UL (ref 4.6–10.2)

## 2024-11-18 ENCOUNTER — OFFICE VISIT (OUTPATIENT)
Dept: HEMATOLOGY/ONCOLOGY | Facility: CLINIC | Age: 59
End: 2024-11-18
Payer: COMMERCIAL

## 2024-11-18 VITALS
HEIGHT: 70 IN | WEIGHT: 246.31 LBS | OXYGEN SATURATION: 97 % | HEART RATE: 80 BPM | SYSTOLIC BLOOD PRESSURE: 122 MMHG | TEMPERATURE: 98 F | DIASTOLIC BLOOD PRESSURE: 81 MMHG | BODY MASS INDEX: 35.26 KG/M2 | RESPIRATION RATE: 16 BRPM

## 2024-11-18 DIAGNOSIS — C64.2 MALIGNANT NEOPLASM OF LEFT KIDNEY: Primary | ICD-10-CM

## 2024-11-18 DIAGNOSIS — I25.10 CORONARY ARTERY DISEASE, UNSPECIFIED VESSEL OR LESION TYPE, UNSPECIFIED WHETHER ANGINA PRESENT, UNSPECIFIED WHETHER NATIVE OR TRANSPLANTED HEART: ICD-10-CM

## 2024-11-18 DIAGNOSIS — E08.00 DIABETES MELLITUS DUE TO UNDERLYING CONDITION WITH HYPEROSMOLARITY WITHOUT COMA, WITHOUT LONG-TERM CURRENT USE OF INSULIN: ICD-10-CM

## 2024-11-18 DIAGNOSIS — I10 HYPERTENSION, UNSPECIFIED TYPE: ICD-10-CM

## 2024-11-18 PROCEDURE — G2211 COMPLEX E/M VISIT ADD ON: HCPCS | Mod: S$PBB,,, | Performed by: NURSE PRACTITIONER

## 2024-11-18 PROCEDURE — 3008F BODY MASS INDEX DOCD: CPT | Mod: CPTII,,, | Performed by: NURSE PRACTITIONER

## 2024-11-18 PROCEDURE — 4010F ACE/ARB THERAPY RXD/TAKEN: CPT | Mod: CPTII,,, | Performed by: NURSE PRACTITIONER

## 2024-11-18 PROCEDURE — 1159F MED LIST DOCD IN RCRD: CPT | Mod: CPTII,,, | Performed by: NURSE PRACTITIONER

## 2024-11-18 PROCEDURE — 99214 OFFICE O/P EST MOD 30 MIN: CPT | Mod: S$PBB,,, | Performed by: NURSE PRACTITIONER

## 2024-11-18 PROCEDURE — 3074F SYST BP LT 130 MM HG: CPT | Mod: CPTII,,, | Performed by: NURSE PRACTITIONER

## 2024-11-18 PROCEDURE — 3079F DIAST BP 80-89 MM HG: CPT | Mod: CPTII,,, | Performed by: NURSE PRACTITIONER

## 2024-11-18 RX ORDER — EPINEPHRINE 0.3 MG/.3ML
0.3 INJECTION SUBCUTANEOUS ONCE AS NEEDED
Status: CANCELLED | OUTPATIENT
Start: 2024-11-18

## 2024-11-18 RX ORDER — SODIUM CHLORIDE 0.9 % (FLUSH) 0.9 %
10 SYRINGE (ML) INJECTION
Status: CANCELLED | OUTPATIENT
Start: 2024-11-18

## 2024-11-18 RX ORDER — DIPHENHYDRAMINE HYDROCHLORIDE 50 MG/ML
50 INJECTION INTRAMUSCULAR; INTRAVENOUS ONCE AS NEEDED
Status: CANCELLED | OUTPATIENT
Start: 2024-11-18

## 2024-11-18 RX ORDER — HEPARIN 100 UNIT/ML
500 SYRINGE INTRAVENOUS
Status: CANCELLED | OUTPATIENT
Start: 2024-11-18

## 2024-11-18 RX ORDER — PROCHLORPERAZINE EDISYLATE 5 MG/ML
10 INJECTION INTRAMUSCULAR; INTRAVENOUS ONCE AS NEEDED
Status: CANCELLED | OUTPATIENT
Start: 2024-11-18

## 2024-12-27 LAB
ALBUMIN SERPL BCP-MCNC: 3.2 G/DL (ref 3.4–5)
ALBUMIN/GLOBULIN RATIO: 0.71 RATIO (ref 1.1–1.8)
ALP SERPL-CCNC: 82 U/L (ref 46–116)
ALT SERPL W P-5'-P-CCNC: 45 U/L (ref 12–78)
ANION GAP SERPL CALC-SCNC: 11 MMOL/L (ref 3–11)
AST SERPL-CCNC: 29 U/L (ref 15–37)
BASOPHILS NFR BLD: 0.9 % (ref 0–3)
BILIRUB SERPL-MCNC: 0.3 MG/DL (ref 0–1)
BUN SERPL-MCNC: 19 MG/DL (ref 7–18)
BUN/CREAT SERPL: 13.19 RATIO (ref 7–18)
CALCIUM SERPL-MCNC: 9.2 MG/DL (ref 8.8–10.5)
CHLORIDE SERPL-SCNC: 100 MMOL/L (ref 100–108)
CO2 SERPL-SCNC: 23 MMOL/L (ref 21–32)
CREAT SERPL-MCNC: 1.44 MG/DL (ref 0.7–1.3)
EOSINOPHIL NFR BLD: 5.3 % (ref 1–3)
ERYTHROCYTE [DISTWIDTH] IN BLOOD BY AUTOMATED COUNT: 15.9 % (ref 12.5–18)
GFR ESTIMATION: 55
GLOBULIN: 4.5 G/DL (ref 2.3–3.5)
GLUCOSE SERPL-MCNC: 222 MG/DL (ref 70–110)
HCT VFR BLD AUTO: 43.1 % (ref 42–52)
HGB BLD-MCNC: 14.5 G/DL (ref 14–18)
LYMPHOCYTES NFR BLD: 26.8 % (ref 25–40)
MCH RBC QN AUTO: 29.2 PG (ref 27–31.2)
MCHC RBC AUTO-ENTMCNC: 33.6 G/DL (ref 31.8–35.4)
MCV RBC AUTO: 86.7 FL (ref 80–97)
MONOCYTES NFR BLD: 5.6 % (ref 1–15)
NEUTROPHILS # BLD AUTO: 6.21 10*3/UL (ref 1.8–7.7)
NEUTROPHILS NFR BLD: 61.2 % (ref 37–80)
NUCLEATED RED BLOOD CELLS: 0 %
PLATELETS: 242 10*3/UL (ref 142–424)
POTASSIUM SERPL-SCNC: 3.9 MMOL/L (ref 3.6–5.2)
PROT SERPL-MCNC: 7.7 G/DL (ref 6.4–8.2)
RBC # BLD AUTO: 4.97 10*6/UL (ref 4.7–6.1)
SODIUM BLD-SCNC: 134 MMOL/L (ref 135–145)
TSH SERPL DL<=0.005 MIU/L-ACNC: 32.86 UIU/ML (ref 0.36–3.74)
WBC # BLD: 10.2 10*3/UL (ref 4.6–10.2)

## 2024-12-27 NOTE — PROGRESS NOTES
MEDICAL ONCOLOGY INITIAL CONSULTATION NOTE    Patient ID: Cecil Martinez is a 59 y.o. male.    Chief Complaint:  Renal cell carcinoma    HPI:  Patient is a 58-year-old male with past medical history of hypertension, coronary artery disease, kidney stone, mixed hyperlipidemia, type 2 diabetes mellitus without complications who recently underwent left nephrectomy which showed findings consistent with clear cell renal cell carcinoma.  Please see detailed pathology report below.  Patient presents to our clinic today for further evaluation reports doing well after recent surgery.       Pathology:  07/22/2024    Left nephrectomy:    Tumor focality: Unifocal  Tumor size: 4.5 cm  Histologic type: Clear renal cell carcinoma  Histologic grade: Grade 4  Tumor extent: Extends into renal sinus  Histologic features: Rhabdoid features present  Tumor necrosis: Not identified  Lymphatic and or vascular invasion: Not identified  Margin status: All margins negative for invasive carcinoma  Pathologic staging: pT3a        Imaging:       MRI abdomen with and without contrast:  05/29/2024    Kidneys enhance symmetrically.  3.9 x 3.1 cm heterogeneously enhancing partially exophytic mass at the anterior aspect left mid pole consistent with malignancy.  Enlarged left para-aortic lymph node measuring 2 x 1.7 cm concerning for regional metastatic disease.    Heterogeneous enhancement in the left renal vein extending into the adjacent IVC which remains throughout the post-contrast acquisitions in his consistent for persistent nonocclusive thrombus.  Left renal artery appears patent.  Left renal stent was reportedly placed in the interval through this is poorly delineated on the current study.    2.3 cm nephrolith at the left UPJ with mild hydronephrosis.  Subcentimeter nonobstructing stone at the left inferior pole.  Subcentimeter simple renal cyst bilaterally.    Impression:  3.9 cm enhancing left renal mass with enlarged periaortic lymph  nodes suggestive of regional metastatic disease.  Nonocclusive thrombus in the left renal vein extending into the adjacent IVC.  Left renal stent was reportedly placed in the interim.  2.3 cm nephrolith at the left UPJ with mild hydronephrosis  Severe hepatic steatosis    Past Medical History:   Diagnosis Date    Clotting disorder     Essential (primary) hypertension     Heart attack 2015    Kidney cancer, primary, with metastasis from kidney to other site     Kidney stone     Mixed hyperlipidemia     Type 2 diabetes mellitus without complications      Family History   Problem Relation Name Age of Onset    Breast cancer Mother      Cancer Mother          Breast    No Known Problems Father      Kidney cancer Brother marjorie      Social History     Socioeconomic History    Marital status:    Tobacco Use    Smoking status: Former     Types: Cigarettes    Smokeless tobacco: Former   Substance and Sexual Activity    Alcohol use: Not Currently    Drug use: Never     Social Drivers of Health     Financial Resource Strain: Low Risk  (10/19/2024)    Received from [facility] and Its Subsidiaries and Affiliates    Overall Financial Resource Strain (CARDIA)     Difficulty of Paying Living Expenses: Not hard at all   Recent Concern: Financial Resource Strain - Medium Risk (8/7/2024)    Overall Financial Resource Strain (CARDIA)     Difficulty of Paying Living Expenses: Somewhat hard   Food Insecurity: No Food Insecurity (10/19/2024)    Received from [facility] and Its Subsidiaries and Affiliates    Hunger Vital Sign     Worried About Running Out of Food in the Last Year: Never true     Ran Out of Food in the Last Year: Never true   Recent Concern: Food Insecurity - Food Insecurity Present (8/7/2024)    Hunger Vital Sign     Worried About Running Out of Food in the Last Year: Sometimes true     Ran Out of Food in the Last Year: Never true    Transportation Needs: No Transportation Needs (10/19/2024)    Received from [facility] and Its Subsidiaries and Affiliates    PRAPARE - Transportation     Lack of Transportation (Medical): No     Lack of Transportation (Non-Medical): No   Physical Activity: Inactive (8/7/2024)    Exercise Vital Sign     Days of Exercise per Week: 0 days     Minutes of Exercise per Session: 0 min   Stress: No Stress Concern Present (8/7/2024)    [employer] - Occupational Stress Questionnaire     Feeling of Stress : Not at all   Housing Stability: Low Risk  (10/19/2024)    Received from [facility] and Its Subsidiaries and Affiliates    Housing Stability Vital Sign     Unable to Pay for Housing in the Last Year: No     Number of Times Moved in the Last Year: 0     Homeless in the Last Year: No     Past Surgical History:   Procedure Laterality Date    Left URS      open heart surgery  10/28/2024    RADICAL NEPHRECTOMY Left     Left-hand assisted laparoscopic    STENT, CAROTID W/ PROTECT      x 2         Review of systems:  Review of Systems   Constitutional:  Negative for activity change, appetite change, chills, diaphoresis, fatigue and unexpected weight change.   HENT:  Negative for congestion, facial swelling, hearing loss, mouth sores, trouble swallowing and voice change.    Eyes:  Negative for photophobia, pain, discharge and itching.   Respiratory:  Negative for apnea, cough, choking, chest tightness and shortness of breath.    Cardiovascular:  Negative for chest pain, palpitations and leg swelling.   Gastrointestinal:  Negative for abdominal distention, abdominal pain, anal bleeding and blood in stool.   Endocrine: Negative for cold intolerance, heat intolerance, polydipsia and polyphagia.   Genitourinary:  Negative for difficulty urinating, dysuria, flank pain and hematuria.   Musculoskeletal:  Negative for arthralgias,  back pain, joint swelling, myalgias, neck pain and neck stiffness.   Skin:  Negative for color change, pallor and wound.   Allergic/Immunologic: Negative for environmental allergies, food allergies and immunocompromised state.   Neurological:  Negative for dizziness, seizures, facial asymmetry, speech difficulty, light-headedness, numbness and headaches.   Hematological:  Negative for adenopathy. Does not bruise/bleed easily.   Psychiatric/Behavioral:  Negative for agitation, behavioral problems, confusion, decreased concentration and sleep disturbance.            Physical Exam  Vitals and nursing note reviewed.   Constitutional:       General: He is not in acute distress.     Appearance: Normal appearance. He is not ill-appearing.   HENT:      Head: Normocephalic and atraumatic.      Nose: No congestion or rhinorrhea.   Eyes:      General: No scleral icterus.     Extraocular Movements: Extraocular movements intact.      Pupils: Pupils are equal, round, and reactive to light.   Cardiovascular:      Rate and Rhythm: Normal rate and regular rhythm.      Pulses: Normal pulses.      Heart sounds: Normal heart sounds. No murmur heard.     No gallop.   Pulmonary:      Effort: Pulmonary effort is normal. No respiratory distress.      Breath sounds: Normal breath sounds. No stridor. No wheezing or rhonchi.   Abdominal:      General: Bowel sounds are normal. There is no distension.      Palpations: There is no mass.      Tenderness: There is no abdominal tenderness. There is no guarding.   Musculoskeletal:         General: No swelling, tenderness, deformity or signs of injury. Normal range of motion.      Cervical back: Normal range of motion and neck supple. No rigidity. No muscular tenderness.      Right lower leg: No edema.      Left lower leg: No edema.   Skin:     General: Skin is warm.      Coloration: Skin is not jaundiced or pale.      Findings: No bruising or lesion.   Neurological:      General: No focal deficit  present.      Mental Status: He is alert and oriented to person, place, and time.      Cranial Nerves: No cranial nerve deficit.      Sensory: No sensory deficit.      Motor: No weakness.      Gait: Gait normal.   Psychiatric:         Mood and Affect: Mood normal.         Behavior: Behavior normal.         Thought Content: Thought content normal.     There were no vitals filed for this visit.    There is no height or weight on file to calculate BSA.    Assessment/Plan:      ECOG 1      Clear renal cell carcinoma arising from the left kidney:    == Status post left simple nephrectomy, pT3a with regional lymph node involvement on imaging but no distant metastatic disease.  Based on current guidelines for stage III clear renal cell carcinoma status post simple mastectomy, patient would need adjuvant pembrolizumab which is category 1 per NCCN guidelines.  In this regard I will recommend port placement and prior authorization request for adjuvant pembrolizumab.  ==08/22/2024:  Patient here today to discuss upcoming chemotherapy/immunotherapy. An extensive discussion was had which included a thorough discussion of potential side effect profile, risk versus benefits, and expected outcomes.  Risks, including but not limited to, possible hair loss, bone marrow damage, damage to body organs, allergic reactions, sterility, nausea/vomiting, constipation/diarrhea, sores in the mouth, secondary cancers, and rarely death were all discuss.  Specific side effects pertaining to their chemotherapy/immunotherapy medications were discussed as well.  The patient agrees with the plan and all questions and their support systems questions have been answered to their satisfaction.  Premedications were prescribed and patient was educated on appropriate usage.  Patient was educated on when to call, and given the number to call, or to notify the provider including but not limited to:  Persistent nausea and vomiting, dehydration, fever greater  than 100.4 lasting over 1 hour induration or isolated feeders greater than 101, rash while on active chemotherapy or immunotherapy, severe pain or new onset pain not controlled by current pain medication regimen.  ==10/07/2024: Tolerating Keytruda well, glucose 195, pt has DMII and was not fasting, had coffee with sugar prior to lab draw, no complaints, labs reviewed, pt cleared for Keytruda  ==11/18/2024: TSH elevated, has always been low in past, will recheck and if again elevated will start on synthroid. He had cabg 3 weeks ago, reports doing well, no s/s infection or complications labs reviewed, pt is cleared for keytruda  ==12/30/2024: TSH 32.86, patient has some occasional fatigue otherwise is asymptomatic. Not on any medication for hypothyroid, will start on 25mcg synthroid daily, educated on taking on empty stomach an hour prior to medications or food. May need titrate up in future, will recheck tsh next labs. Tolerating keytruda well, labs reviewed, pt cleraed for Keytruda      2. HTN:  == Elevated. Continue current medications and follow up closely.       3. CAD:   == Stable. Continue to follow up with PCP    4. DM:  == On Glipizide  ==glucose nonfasting elevated, monitor as instructed per pcp and follow up with pcp    5. Hypothyroidism  ==Start synthroid 25mcg po daily    Plan:  Adjuvant keytruda for 1 year - start Monday 8/26/2024, 6 week dosing  RTC 6 weeks cbc cmp tsh prior    Total time spent in counseling and discussion about further management options including relevant lab work, treatment,  prognosis, medications and intended side effects was more than 30 minutes. More than 50% of the time was spent on counseling and coordination of care.  I spent a total of 30 minutes on the day of the visit.This includes face to face time and non-face to face time preparing to see the patient (eg, review of tests), Obtaining and/or reviewing separately obtained history, Documenting clinical information in the  electronic or other health record, Independently interpreting resultsand communicating results to the patient/family/caregiver, or Care coordination.

## 2024-12-30 ENCOUNTER — OFFICE VISIT (OUTPATIENT)
Dept: HEMATOLOGY/ONCOLOGY | Facility: CLINIC | Age: 59
End: 2024-12-30
Payer: COMMERCIAL

## 2024-12-30 VITALS
HEART RATE: 72 BPM | WEIGHT: 250.19 LBS | TEMPERATURE: 98 F | DIASTOLIC BLOOD PRESSURE: 91 MMHG | HEIGHT: 70 IN | SYSTOLIC BLOOD PRESSURE: 137 MMHG | BODY MASS INDEX: 35.82 KG/M2 | OXYGEN SATURATION: 95 % | RESPIRATION RATE: 16 BRPM

## 2024-12-30 DIAGNOSIS — I25.10 CORONARY ARTERY DISEASE, UNSPECIFIED VESSEL OR LESION TYPE, UNSPECIFIED WHETHER ANGINA PRESENT, UNSPECIFIED WHETHER NATIVE OR TRANSPLANTED HEART: ICD-10-CM

## 2024-12-30 DIAGNOSIS — I10 HYPERTENSION, UNSPECIFIED TYPE: ICD-10-CM

## 2024-12-30 DIAGNOSIS — C64.2 RENAL CELL CARCINOMA OF LEFT KIDNEY: ICD-10-CM

## 2024-12-30 DIAGNOSIS — C64.2 MALIGNANT NEOPLASM OF LEFT KIDNEY: Primary | ICD-10-CM

## 2024-12-30 DIAGNOSIS — E08.00 DIABETES MELLITUS DUE TO UNDERLYING CONDITION WITH HYPEROSMOLARITY WITHOUT COMA, WITHOUT LONG-TERM CURRENT USE OF INSULIN: ICD-10-CM

## 2024-12-30 RX ORDER — SODIUM CHLORIDE 0.9 % (FLUSH) 0.9 %
10 SYRINGE (ML) INJECTION
Status: CANCELLED | OUTPATIENT
Start: 2024-12-30

## 2024-12-30 RX ORDER — HEPARIN 100 UNIT/ML
500 SYRINGE INTRAVENOUS
Status: CANCELLED | OUTPATIENT
Start: 2024-12-30

## 2024-12-30 RX ORDER — LEVOTHYROXINE SODIUM 25 UG/1
25 TABLET ORAL
Qty: 30 TABLET | Refills: 11 | Status: SHIPPED | OUTPATIENT
Start: 2024-12-30 | End: 2025-12-30

## 2024-12-30 RX ORDER — PROCHLORPERAZINE EDISYLATE 5 MG/ML
10 INJECTION INTRAMUSCULAR; INTRAVENOUS ONCE AS NEEDED
Status: CANCELLED | OUTPATIENT
Start: 2024-12-30

## 2024-12-30 RX ORDER — DIPHENHYDRAMINE HYDROCHLORIDE 50 MG/ML
50 INJECTION INTRAMUSCULAR; INTRAVENOUS ONCE AS NEEDED
Status: CANCELLED | OUTPATIENT
Start: 2024-12-30

## 2024-12-30 RX ORDER — EPINEPHRINE 0.3 MG/.3ML
0.3 INJECTION SUBCUTANEOUS ONCE AS NEEDED
Status: CANCELLED | OUTPATIENT
Start: 2024-12-30

## 2025-02-07 ENCOUNTER — TELEPHONE (OUTPATIENT)
Dept: HEMATOLOGY/ONCOLOGY | Facility: CLINIC | Age: 60
End: 2025-02-07

## 2025-02-07 DIAGNOSIS — E03.9 HYPOTHYROIDISM, UNSPECIFIED TYPE: Primary | ICD-10-CM

## 2025-02-07 DIAGNOSIS — C64.2 MALIGNANT NEOPLASM OF LEFT KIDNEY: ICD-10-CM

## 2025-02-07 LAB
ALBUMIN SERPL BCP-MCNC: 3.3 G/DL (ref 3.4–5)
ALBUMIN/GLOBULIN RATIO: 0.82 RATIO (ref 1.1–1.8)
ALP SERPL-CCNC: 68 U/L (ref 46–116)
ALT SERPL W P-5'-P-CCNC: 33 U/L (ref 12–78)
ANION GAP SERPL CALC-SCNC: 8 MMOL/L (ref 3–11)
AST SERPL-CCNC: 20 U/L (ref 15–37)
BASOPHILS NFR BLD: 1 % (ref 0–3)
BILIRUB SERPL-MCNC: 0.3 MG/DL (ref 0–1)
BUN SERPL-MCNC: 17 MG/DL (ref 7–18)
BUN/CREAT SERPL: 11.33 RATIO (ref 7–18)
CALCIUM SERPL-MCNC: 8.9 MG/DL (ref 8.8–10.5)
CHLORIDE SERPL-SCNC: 101 MMOL/L (ref 100–108)
CO2 SERPL-SCNC: 28 MMOL/L (ref 21–32)
CREAT SERPL-MCNC: 1.5 MG/DL (ref 0.7–1.3)
EOSINOPHIL NFR BLD: 5 % (ref 1–3)
ERYTHROCYTE [DISTWIDTH] IN BLOOD BY AUTOMATED COUNT: 19 % (ref 12.5–18)
GFR ESTIMATION: 53
GLOBULIN: 4 G/DL (ref 2.3–3.5)
GLUCOSE SERPL-MCNC: 165 MG/DL (ref 70–110)
HCT VFR BLD AUTO: 41.3 % (ref 42–52)
HGB BLD-MCNC: 13.8 G/DL (ref 14–18)
LYMPHOCYTES NFR BLD: 29.7 % (ref 25–40)
MCH RBC QN AUTO: 30.1 PG (ref 27–31.2)
MCHC RBC AUTO-ENTMCNC: 33.4 G/DL (ref 31.8–35.4)
MCV RBC AUTO: 90.2 FL (ref 80–97)
MONOCYTES NFR BLD: 6.3 % (ref 1–15)
NEUTROPHILS # BLD AUTO: 4.52 10*3/UL (ref 1.8–7.7)
NEUTROPHILS NFR BLD: 57.7 % (ref 37–80)
NUCLEATED RED BLOOD CELLS: 0 %
PLATELETS: 253 10*3/UL (ref 142–424)
POTASSIUM SERPL-SCNC: 4.2 MMOL/L (ref 3.6–5.2)
PROT SERPL-MCNC: 7.3 G/DL (ref 6.4–8.2)
RBC # BLD AUTO: 4.58 10*6/UL (ref 4.7–6.1)
SODIUM BLD-SCNC: 137 MMOL/L (ref 135–145)
TSH SERPL DL<=0.005 MIU/L-ACNC: 52.39 UIU/ML (ref 0.36–3.74)
WBC # BLD: 7.8 10*3/UL (ref 4.6–10.2)

## 2025-02-07 RX ORDER — LEVOTHYROXINE SODIUM 50 UG/1
50 TABLET ORAL
Qty: 30 TABLET | Refills: 11 | Status: SHIPPED | OUTPATIENT
Start: 2025-02-07 | End: 2026-02-07

## 2025-02-07 NOTE — TELEPHONE ENCOUNTER
----- Message from Shelly Wilkerson NP sent at 2/7/2025  9:50 AM CST -----  Please let patient know I increased his synthroid from 25mcg to 50 and sent out a new rx. He can double his 25's to use up the rx he has  ----- Message -----  From: Interface, Incoming Lab Results  Sent: 2/7/2025   9:38 AM CST  To: Shelly Wilkerson NP

## 2025-02-10 ENCOUNTER — OFFICE VISIT (OUTPATIENT)
Dept: HEMATOLOGY/ONCOLOGY | Facility: CLINIC | Age: 60
End: 2025-02-10
Payer: COMMERCIAL

## 2025-02-10 VITALS
DIASTOLIC BLOOD PRESSURE: 77 MMHG | TEMPERATURE: 98 F | HEART RATE: 72 BPM | HEIGHT: 70 IN | RESPIRATION RATE: 16 BRPM | OXYGEN SATURATION: 96 % | BODY MASS INDEX: 37.54 KG/M2 | WEIGHT: 262.19 LBS | SYSTOLIC BLOOD PRESSURE: 123 MMHG

## 2025-02-10 DIAGNOSIS — E03.9 HYPOTHYROIDISM, UNSPECIFIED TYPE: ICD-10-CM

## 2025-02-10 DIAGNOSIS — N18.31 DIABETES MELLITUS DUE TO UNDERLYING CONDITION WITH STAGE 3A CHRONIC KIDNEY DISEASE, WITHOUT LONG-TERM CURRENT USE OF INSULIN: ICD-10-CM

## 2025-02-10 DIAGNOSIS — C64.2 RENAL CELL CARCINOMA OF LEFT KIDNEY: Primary | ICD-10-CM

## 2025-02-10 DIAGNOSIS — E08.22 DIABETES MELLITUS DUE TO UNDERLYING CONDITION WITH STAGE 3A CHRONIC KIDNEY DISEASE, WITHOUT LONG-TERM CURRENT USE OF INSULIN: ICD-10-CM

## 2025-02-10 PROBLEM — N18.32 DIABETES MELLITUS DUE TO UNDERLYING CONDITION WITH STAGE 3B CHRONIC KIDNEY DISEASE, WITHOUT LONG-TERM CURRENT USE OF INSULIN: Status: ACTIVE | Noted: 2025-02-10

## 2025-02-10 PROCEDURE — G2211 COMPLEX E/M VISIT ADD ON: HCPCS | Mod: S$PBB,,, | Performed by: NURSE PRACTITIONER

## 2025-02-10 PROCEDURE — 99215 OFFICE O/P EST HI 40 MIN: CPT | Mod: S$PBB,,, | Performed by: NURSE PRACTITIONER

## 2025-02-10 PROCEDURE — 3008F BODY MASS INDEX DOCD: CPT | Mod: CPTII,,, | Performed by: NURSE PRACTITIONER

## 2025-02-10 PROCEDURE — 3074F SYST BP LT 130 MM HG: CPT | Mod: CPTII,,, | Performed by: NURSE PRACTITIONER

## 2025-02-10 PROCEDURE — 1159F MED LIST DOCD IN RCRD: CPT | Mod: CPTII,,, | Performed by: NURSE PRACTITIONER

## 2025-02-10 PROCEDURE — 3078F DIAST BP <80 MM HG: CPT | Mod: CPTII,,, | Performed by: NURSE PRACTITIONER

## 2025-02-10 RX ORDER — HEPARIN 100 UNIT/ML
500 SYRINGE INTRAVENOUS
Status: CANCELLED | OUTPATIENT
Start: 2025-02-10

## 2025-02-10 RX ORDER — EPINEPHRINE 0.3 MG/.3ML
0.3 INJECTION SUBCUTANEOUS ONCE AS NEEDED
Status: CANCELLED | OUTPATIENT
Start: 2025-02-10

## 2025-02-10 RX ORDER — SODIUM CHLORIDE 0.9 % (FLUSH) 0.9 %
10 SYRINGE (ML) INJECTION
Status: CANCELLED | OUTPATIENT
Start: 2025-02-10

## 2025-02-10 RX ORDER — PROCHLORPERAZINE EDISYLATE 5 MG/ML
10 INJECTION INTRAMUSCULAR; INTRAVENOUS ONCE AS NEEDED
Status: CANCELLED | OUTPATIENT
Start: 2025-02-10

## 2025-02-10 RX ORDER — DIPHENHYDRAMINE HYDROCHLORIDE 50 MG/ML
50 INJECTION INTRAMUSCULAR; INTRAVENOUS ONCE AS NEEDED
Status: CANCELLED | OUTPATIENT
Start: 2025-02-10

## 2025-02-10 NOTE — PROGRESS NOTES
MEDICAL ONCOLOGY INITIAL CONSULTATION NOTE    Patient ID: Cecil Martinez is a 59 y.o. male.    Chief Complaint:  Renal cell carcinoma    HPI:  Patient is a 58-year-old male with past medical history of hypertension, coronary artery disease, kidney stone, mixed hyperlipidemia, type 2 diabetes mellitus without complications who recently underwent left nephrectomy which showed findings consistent with clear cell renal cell carcinoma.  Please see detailed pathology report below.  Patient presents to our clinic today for further evaluation reports doing well after recent surgery.       Pathology:  07/22/2024    Left nephrectomy:    Tumor focality: Unifocal  Tumor size: 4.5 cm  Histologic type: Clear renal cell carcinoma  Histologic grade: Grade 4  Tumor extent: Extends into renal sinus  Histologic features: Rhabdoid features present  Tumor necrosis: Not identified  Lymphatic and or vascular invasion: Not identified  Margin status: All margins negative for invasive carcinoma  Pathologic staging: pT3a        Imaging:       MRI abdomen with and without contrast:  05/29/2024    Kidneys enhance symmetrically.  3.9 x 3.1 cm heterogeneously enhancing partially exophytic mass at the anterior aspect left mid pole consistent with malignancy.  Enlarged left para-aortic lymph node measuring 2 x 1.7 cm concerning for regional metastatic disease.    Heterogeneous enhancement in the left renal vein extending into the adjacent IVC which remains throughout the post-contrast acquisitions in his consistent for persistent nonocclusive thrombus.  Left renal artery appears patent.  Left renal stent was reportedly placed in the interval through this is poorly delineated on the current study.    2.3 cm nephrolith at the left UPJ with mild hydronephrosis.  Subcentimeter nonobstructing stone at the left inferior pole.  Subcentimeter simple renal cyst bilaterally.    Impression:  3.9 cm enhancing left renal mass with enlarged periaortic lymph  nodes suggestive of regional metastatic disease.  Nonocclusive thrombus in the left renal vein extending into the adjacent IVC.  Left renal stent was reportedly placed in the interim.  2.3 cm nephrolith at the left UPJ with mild hydronephrosis  Severe hepatic steatosis    Past Medical History:   Diagnosis Date    Clotting disorder     Essential (primary) hypertension     Heart attack 2015    Kidney cancer, primary, with metastasis from kidney to other site     Kidney stone     Mixed hyperlipidemia     Type 2 diabetes mellitus without complications      Family History   Problem Relation Name Age of Onset    Breast cancer Mother      Cancer Mother          Breast    No Known Problems Father      Kidney cancer Brother marjorie      Social History     Socioeconomic History    Marital status:    Tobacco Use    Smoking status: Former     Types: Cigarettes    Smokeless tobacco: Former   Substance and Sexual Activity    Alcohol use: Not Currently    Drug use: Never     Social Drivers of Health     Financial Resource Strain: Low Risk  (10/19/2024)    Received from [facility] and Its Subsidiaries and Affiliates    Overall Financial Resource Strain (CARDIA)     Difficulty of Paying Living Expenses: Not hard at all   Recent Concern: Financial Resource Strain - Medium Risk (8/7/2024)    Overall Financial Resource Strain (CARDIA)     Difficulty of Paying Living Expenses: Somewhat hard   Food Insecurity: No Food Insecurity (10/19/2024)    Received from [facility] and Its Subsidiaries and Affiliates    Hunger Vital Sign     Worried About Running Out of Food in the Last Year: Never true     Ran Out of Food in the Last Year: Never true   Recent Concern: Food Insecurity - Food Insecurity Present (8/7/2024)    Hunger Vital Sign     Worried About Running Out of Food in the Last Year: Sometimes true     Ran Out of Food in the Last Year: Never true    Transportation Needs: No Transportation Needs (10/19/2024)    Received from [facility] and Its Subsidiaries and Affiliates    PRAPARE - Transportation     Lack of Transportation (Medical): No     Lack of Transportation (Non-Medical): No   Physical Activity: Inactive (8/7/2024)    Exercise Vital Sign     Days of Exercise per Week: 0 days     Minutes of Exercise per Session: 0 min   Stress: No Stress Concern Present (8/7/2024)    [employer] [employer] - Occupational Stress Questionnaire     Feeling of Stress : Not at all   Housing Stability: Low Risk  (10/19/2024)    Received from [facility] and Its Subsidiaries and Affiliates    Housing Stability Vital Sign     Unable to Pay for Housing in the Last Year: No     Number of Times Moved in the Last Year: 0     Homeless in the Last Year: No     Past Surgical History:   Procedure Laterality Date    Left URS      open heart surgery  10/28/2024    RADICAL NEPHRECTOMY Left     Left-hand assisted laparoscopic    STENT, CAROTID W/ PROTECT      x 2         Review of systems:  Review of Systems   Constitutional:  Negative for activity change, appetite change, chills, diaphoresis, fatigue and unexpected weight change.   HENT:  Negative for congestion, facial swelling, hearing loss, mouth sores, trouble swallowing and voice change.    Eyes:  Negative for photophobia, pain, discharge and itching.   Respiratory:  Negative for apnea, cough, choking, chest tightness and shortness of breath.    Cardiovascular:  Negative for chest pain, palpitations and leg swelling.   Gastrointestinal:  Negative for abdominal distention, abdominal pain, anal bleeding and blood in stool.   Endocrine: Negative for cold intolerance, heat intolerance, polydipsia and polyphagia.   Genitourinary:  Negative for difficulty urinating, dysuria, flank pain and hematuria.   Musculoskeletal:  Negative for arthralgias,  back pain, joint swelling, myalgias, neck pain and neck stiffness.   Skin:  Negative for color change, pallor and wound.   Allergic/Immunologic: Negative for environmental allergies, food allergies and immunocompromised state.   Neurological:  Negative for dizziness, seizures, facial asymmetry, speech difficulty, light-headedness, numbness and headaches.   Hematological:  Negative for adenopathy. Does not bruise/bleed easily.   Psychiatric/Behavioral:  Negative for agitation, behavioral problems, confusion, decreased concentration and sleep disturbance.            Physical Exam  Vitals and nursing note reviewed.   Constitutional:       General: He is not in acute distress.     Appearance: Normal appearance. He is not ill-appearing.   HENT:      Head: Normocephalic and atraumatic.      Nose: No congestion or rhinorrhea.   Eyes:      General: No scleral icterus.     Extraocular Movements: Extraocular movements intact.      Pupils: Pupils are equal, round, and reactive to light.   Cardiovascular:      Rate and Rhythm: Normal rate and regular rhythm.      Pulses: Normal pulses.      Heart sounds: Normal heart sounds. No murmur heard.     No gallop.   Pulmonary:      Effort: Pulmonary effort is normal. No respiratory distress.      Breath sounds: Normal breath sounds. No stridor. No wheezing or rhonchi.   Abdominal:      General: Bowel sounds are normal. There is no distension.      Palpations: There is no mass.      Tenderness: There is no abdominal tenderness. There is no guarding.   Musculoskeletal:         General: No swelling, tenderness, deformity or signs of injury. Normal range of motion.      Cervical back: Normal range of motion and neck supple. No rigidity. No muscular tenderness.      Right lower leg: No edema.      Left lower leg: No edema.   Skin:     General: Skin is warm.      Coloration: Skin is not jaundiced or pale.      Findings: No bruising or lesion.   Neurological:      General: No focal deficit  present.      Mental Status: He is alert and oriented to person, place, and time.      Cranial Nerves: No cranial nerve deficit.      Sensory: No sensory deficit.      Motor: No weakness.      Gait: Gait normal.   Psychiatric:         Mood and Affect: Mood normal.         Behavior: Behavior normal.         Thought Content: Thought content normal.       Vitals:    02/10/25 0847   BP: 123/77   Pulse: 72   Resp: 16       Body surface area is 2.42 meters squared.      Lab Results   Component Value Date    WBC 7.8 02/07/2025    HGB 13.8 (L) 02/07/2025    HCT 41.3 (L) 02/07/2025    MCV 90.2 02/07/2025    LABPLAT 253 02/07/2025      CMP  Sodium   Date Value Ref Range Status   02/07/2025 137 135 - 145 mmol/L Final     Potassium   Date Value Ref Range Status   02/07/2025 4.2 3.6 - 5.2 mmol/L Final     Chloride   Date Value Ref Range Status   02/07/2025 101 100 - 108 mmol/L Final     CO2   Date Value Ref Range Status   02/07/2025 28 21 - 32 mmol/L Final     Glucose   Date Value Ref Range Status   02/07/2025 165 (H) 70 - 110 mg/dL Final     BUN   Date Value Ref Range Status   02/07/2025 17 7 - 18 mg/dL Final     Creatinine   Date Value Ref Range Status   02/07/2025 1.50 (H) 0.70 - 1.30 mg/dL Final     Calcium   Date Value Ref Range Status   02/07/2025 8.9 8.8 - 10.5 mg/dL Final     Total Protein   Date Value Ref Range Status   02/07/2025 7.3 6.4 - 8.2 g/dL Final     Albumin   Date Value Ref Range Status   02/07/2025 3.3 (L) 3.4 - 5.0 g/dL Final     Total Bilirubin   Date Value Ref Range Status   02/07/2025 0.3 0.0 - 1.0 mg/dL Final     Alkaline Phosphatase   Date Value Ref Range Status   02/07/2025 68 46 - 116 U/L Final     AST   Date Value Ref Range Status   02/07/2025 20 15 - 37 U/L Final     ALT   Date Value Ref Range Status   02/07/2025 33 12 - 78 U/L Final     Anion Gap   Date Value Ref Range Status   02/07/2025 8.0 3.0 - 11.0 mmol/L Final     Assessment/Plan:      ECOG 1      Clear renal cell carcinoma arising from the  left kidney:    == Status post left simple nephrectomy, pT3a with regional lymph node involvement on imaging but no distant metastatic disease.  Based on current guidelines for stage III clear renal cell carcinoma status post simple mastectomy, patient would need adjuvant pembrolizumab which is category 1 per NCCN guidelines.  In this regard I will recommend port placement and prior authorization request for adjuvant pembrolizumab.  ==08/22/2024:  Patient here today to discuss upcoming chemotherapy/immunotherapy. An extensive discussion was had which included a thorough discussion of potential side effect profile, risk versus benefits, and expected outcomes.  Risks, including but not limited to, possible hair loss, bone marrow damage, damage to body organs, allergic reactions, sterility, nausea/vomiting, constipation/diarrhea, sores in the mouth, secondary cancers, and rarely death were all discuss.  Specific side effects pertaining to their chemotherapy/immunotherapy medications were discussed as well.  The patient agrees with the plan and all questions and their support systems questions have been answered to their satisfaction.  Premedications were prescribed and patient was educated on appropriate usage.  Patient was educated on when to call, and given the number to call, or to notify the provider including but not limited to:  Persistent nausea and vomiting, dehydration, fever greater than 100.4 lasting over 1 hour induration or isolated feeders greater than 101, rash while on active chemotherapy or immunotherapy, severe pain or new onset pain not controlled by current pain medication regimen.  ==10/07/2024: Tolerating Keytruda well, glucose 195, pt has DMII and was not fasting, had coffee with sugar prior to lab draw, no complaints, labs reviewed, pt cleared for Keytruda  ==11/18/2024: TSH elevated, has always been low in past, will recheck and if again elevated will start on synthroid. He had cabg 3 weeks  ago, reports doing well, no s/s infection or complications labs reviewed, pt is cleared for keytruda  ==12/30/2024: TSH 32.86, patient has some occasional fatigue otherwise is asymptomatic. Not on any medication for hypothyroid, will start on 25mcg synthroid daily, educated on taking on empty stomach an hour prior to medications or food. May need titrate up in future, will recheck tsh next labs. Tolerating keytruda well, labs reviewed, pt cleraed for Keytruda  == 2/10/25: TSH 52.39, advised to not take synthroid with other medications. Last Ct scan was 10/24 will proceed with repeat imaging but will proceed with PET scan due to CKD stage 3a.     2. HTN:  == Elevated. Continue current medications and follow up closely.       3. CAD:   == Stable. Continue to follow up with PCP    4. DM with CKD   == On Glipizide  ==glucose nonfasting elevated, monitor as instructed per pcp and follow up with pcp  == elevated BS noted Fglu 165, worsening SrCr noted, advised to f/u with Pcp     5. Hypothyroidism  == increase synthroid 50 mcg po daily    Plan:  Adjuvant keytruda for 1 year - start Monday 8/26/2024, 6 week dosing  RTC 6 weeks cbc cmp tsh prior  Synthroid 50 mcg daily on empty  PCP appt to discuss worsening CKD with elevated bs  PET/CT, due to CKD will not proceed with CT scan    Total time spent in counseling and discussion about further management options including relevant lab work, treatment,  prognosis, medications and intended side effects was more than 40 minutes. More than 50% of the time was spent on counseling and coordination of care.  I spent a total of 40 minutes on the day of the visit.This includes face to face time and non-face to face time preparing to see the patient (eg, review of tests), Obtaining and/or reviewing separately obtained history, Documenting clinical information in the electronic or other health record, Independently interpreting resultsand communicating results to the  patient/family/caregiver, or Care coordination.

## 2025-03-24 ENCOUNTER — OFFICE VISIT (OUTPATIENT)
Dept: HEMATOLOGY/ONCOLOGY | Facility: CLINIC | Age: 60
End: 2025-03-24
Payer: COMMERCIAL

## 2025-03-24 ENCOUNTER — RESULTS FOLLOW-UP (OUTPATIENT)
Dept: HEMATOLOGY/ONCOLOGY | Facility: CLINIC | Age: 60
End: 2025-03-24

## 2025-03-24 ENCOUNTER — TELEPHONE (OUTPATIENT)
Dept: HEMATOLOGY/ONCOLOGY | Facility: CLINIC | Age: 60
End: 2025-03-24

## 2025-03-24 VITALS
SYSTOLIC BLOOD PRESSURE: 121 MMHG | TEMPERATURE: 98 F | HEART RATE: 77 BPM | RESPIRATION RATE: 16 BRPM | WEIGHT: 268.13 LBS | BODY MASS INDEX: 38.39 KG/M2 | HEIGHT: 70 IN | OXYGEN SATURATION: 97 % | DIASTOLIC BLOOD PRESSURE: 80 MMHG

## 2025-03-24 DIAGNOSIS — C64.2 RENAL CELL CARCINOMA OF LEFT KIDNEY: Primary | ICD-10-CM

## 2025-03-24 RX ORDER — EPINEPHRINE 0.3 MG/.3ML
0.3 INJECTION SUBCUTANEOUS ONCE AS NEEDED
Status: CANCELLED | OUTPATIENT
Start: 2025-03-24

## 2025-03-24 RX ORDER — DIPHENHYDRAMINE HYDROCHLORIDE 50 MG/ML
50 INJECTION, SOLUTION INTRAMUSCULAR; INTRAVENOUS ONCE AS NEEDED
Status: CANCELLED | OUTPATIENT
Start: 2025-03-24

## 2025-03-24 RX ORDER — HEPARIN 100 UNIT/ML
500 SYRINGE INTRAVENOUS
Status: CANCELLED | OUTPATIENT
Start: 2025-03-24

## 2025-03-24 RX ORDER — SODIUM CHLORIDE 0.9 % (FLUSH) 0.9 %
10 SYRINGE (ML) INJECTION
Status: CANCELLED | OUTPATIENT
Start: 2025-03-24

## 2025-03-24 RX ORDER — PROCHLORPERAZINE EDISYLATE 5 MG/ML
10 INJECTION INTRAMUSCULAR; INTRAVENOUS ONCE AS NEEDED
Status: CANCELLED | OUTPATIENT
Start: 2025-03-24

## 2025-03-24 NOTE — TELEPHONE ENCOUNTER
----- Message from Kaley Honeycutt NP sent at 3/24/2025  8:36 AM CDT -----  Please call patient and tell him to take 2 synthroid tablets a day please.   ----- Message -----  From: Interface, Lab In [facility]  Sent: 3/21/2025   1:04 PM CDT  To: Kaley Honeycutt NP

## 2025-03-24 NOTE — PROGRESS NOTES
MEDICAL ONCOLOGY FOLLOW UP CONSULTATION NOTE    Patient ID: Cecil Martinez is a 59 y.o. male.    Chief Complaint:  Renal cell carcinoma    HPI:  Patient is a 58-year-old male with past medical history of hypertension, coronary artery disease, kidney stone, mixed hyperlipidemia, type 2 diabetes mellitus without complications who recently underwent left nephrectomy which showed findings consistent with clear cell renal cell carcinoma.  Please see detailed pathology report below.  Patient presents to our clinic today for further evaluation reports doing well after recent surgery.       Pathology:  07/22/2024    Left nephrectomy:    Tumor focality: Unifocal  Tumor size: 4.5 cm  Histologic type: Clear renal cell carcinoma  Histologic grade: Grade 4  Tumor extent: Extends into renal sinus  Histologic features: Rhabdoid features present  Tumor necrosis: Not identified  Lymphatic and or vascular invasion: Not identified  Margin status: All margins negative for invasive carcinoma  Pathologic staging: pT3a        Imaging:       MRI abdomen with and without contrast:  05/29/2024    Kidneys enhance symmetrically.  3.9 x 3.1 cm heterogeneously enhancing partially exophytic mass at the anterior aspect left mid pole consistent with malignancy.  Enlarged left para-aortic lymph node measuring 2 x 1.7 cm concerning for regional metastatic disease.    Heterogeneous enhancement in the left renal vein extending into the adjacent IVC which remains throughout the post-contrast acquisitions in his consistent for persistent nonocclusive thrombus.  Left renal artery appears patent.  Left renal stent was reportedly placed in the interval through this is poorly delineated on the current study.    2.3 cm nephrolith at the left UPJ with mild hydronephrosis.  Subcentimeter nonobstructing stone at the left inferior pole.  Subcentimeter simple renal cyst bilaterally.    Impression:  3.9 cm enhancing left renal mass with enlarged periaortic lymph  nodes suggestive of regional metastatic disease.  Nonocclusive thrombus in the left renal vein extending into the adjacent IVC.  Left renal stent was reportedly placed in the interim.  2.3 cm nephrolith at the left UPJ with mild hydronephrosis  Severe hepatic steatosis    Past Medical History:   Diagnosis Date    Clotting disorder     Essential (primary) hypertension     Heart attack 2015    Kidney cancer, primary, with metastasis from kidney to other site     Kidney stone     Mixed hyperlipidemia     Type 2 diabetes mellitus without complications      Family History   Problem Relation Name Age of Onset    Breast cancer Mother      Cancer Mother          Breast    No Known Problems Father      Kidney cancer Brother marjorie      Social History     Socioeconomic History    Marital status:    Tobacco Use    Smoking status: Former     Types: Cigarettes    Smokeless tobacco: Former   Substance and Sexual Activity    Alcohol use: Not Currently    Drug use: Never     Social Drivers of Health     Financial Resource Strain: Low Risk  (10/19/2024)    Received from [facility] and [facility] Subsidiaries and Affiliates    Overall Financial Resource Strain (CARDIA)     Difficulty of Paying Living Expenses: Not hard at all   Recent Concern: Financial Resource Strain - Medium Risk (8/7/2024)    Overall Financial Resource Strain (CARDIA)     Difficulty of Paying Living Expenses: Somewhat hard   Food Insecurity: No Food Insecurity (10/19/2024)    Received from [facility] and Its Subsidiaries and Affiliates    Hunger Vital Sign     Worried About Running Out of Food in the Last Year: Never true     Ran Out of Food in the Last Year: Never true   Recent Concern: Food Insecurity - Food Insecurity Present (8/7/2024)    Hunger Vital Sign     Worried About Running Out of Food in the Last Year: Sometimes true     Ran Out of Food in the Last Year: Never true    Transportation Needs: No Transportation Needs (10/19/2024)    Received from [facility] and Its Subsidiaries and Affiliates    PRAPARE - Transportation     Lack of Transportation (Medical): No     Lack of Transportation (Non-Medical): No   Physical Activity: Inactive (8/7/2024)    Exercise Vital Sign     Days of Exercise per Week: 0 days     Minutes of Exercise per Session: 0 min   Stress: No Stress Concern Present (8/7/2024)    [employer] [place] - Occupational Stress Questionnaire     Feeling of Stress : Not at all   Housing Stability: Low Risk  (10/19/2024)    Received from [facility] and Its Subsidiaries and Affiliates    Housing Stability Vital Sign     Unable to Pay for Housing in the Last Year: No     Number of Times Moved in the Last Year: 0     Homeless in the Last Year: No     Past Surgical History:   Procedure Laterality Date    Left URS      open heart surgery  10/28/2024    RADICAL NEPHRECTOMY Left     Left-hand assisted laparoscopic    STENT, CAROTID W/ PROTECT      x 2         Review of systems:  Review of Systems   Constitutional:  Negative for activity change, appetite change, chills, diaphoresis, fatigue and unexpected weight change.   HENT:  Negative for congestion, facial swelling, hearing loss, mouth sores, trouble swallowing and voice change.    Eyes:  Negative for photophobia, pain, discharge and itching.   Respiratory:  Negative for apnea, cough, choking, chest tightness and shortness of breath.    Cardiovascular:  Negative for chest pain, palpitations and leg swelling.   Gastrointestinal:  Negative for abdominal distention, abdominal pain, anal bleeding and blood in stool.   Endocrine: Negative for cold intolerance, heat intolerance, polydipsia and polyphagia.   Genitourinary:  Negative for difficulty urinating, dysuria, flank pain and hematuria.   Musculoskeletal:  Negative for arthralgias,  back pain, joint swelling, myalgias, neck pain and neck stiffness.   Skin:  Negative for color change, pallor and wound.   Allergic/Immunologic: Negative for environmental allergies, food allergies and immunocompromised state.   Neurological:  Negative for dizziness, seizures, facial asymmetry, speech difficulty, light-headedness, numbness and headaches.   Hematological:  Negative for adenopathy. Does not bruise/bleed easily.   Psychiatric/Behavioral:  Negative for agitation, behavioral problems, confusion, decreased concentration and sleep disturbance.            Physical Exam  Vitals and nursing note reviewed.   Constitutional:       General: He is not in acute distress.     Appearance: Normal appearance. He is not ill-appearing.   HENT:      Head: Normocephalic and atraumatic.      Nose: No congestion or rhinorrhea.   Eyes:      General: No scleral icterus.     Extraocular Movements: Extraocular movements intact.      Pupils: Pupils are equal, round, and reactive to light.   Cardiovascular:      Rate and Rhythm: Normal rate and regular rhythm.      Pulses: Normal pulses.      Heart sounds: Normal heart sounds. No murmur heard.     No gallop.   Pulmonary:      Effort: Pulmonary effort is normal. No respiratory distress.      Breath sounds: Normal breath sounds. No stridor. No wheezing or rhonchi.   Abdominal:      General: Bowel sounds are normal. There is no distension.      Palpations: There is no mass.      Tenderness: There is no abdominal tenderness. There is no guarding.   Musculoskeletal:         General: No swelling, tenderness, deformity or signs of injury. Normal range of motion.      Cervical back: Normal range of motion and neck supple. No rigidity. No muscular tenderness.      Right lower leg: No edema.      Left lower leg: No edema.   Skin:     General: Skin is warm.      Coloration: Skin is not jaundiced or pale.      Findings: No bruising or lesion.   Neurological:      General: No focal deficit  present.      Mental Status: He is alert and oriented to person, place, and time.      Cranial Nerves: No cranial nerve deficit.      Sensory: No sensory deficit.      Motor: No weakness.      Gait: Gait normal.   Psychiatric:         Mood and Affect: Mood normal.         Behavior: Behavior normal.         Thought Content: Thought content normal.       Vitals:    03/24/25 0900   BP: 121/80   Pulse: 77   Resp: 16   Temp: 97.9 °F (36.6 °C)       Body surface area is 2.45 meters squared.      Lab Results   Component Value Date    WBC 10.88 (H) 03/21/2025    HGB 14.9 03/21/2025    HCT 44.5 03/21/2025    MCV 91.6 03/21/2025    LABPLAT 253 02/07/2025      CMP  Sodium   Date Value Ref Range Status   03/21/2025 135 (L) 136 - 145 mmol/L Final     Potassium   Date Value Ref Range Status   03/21/2025 4.9 3.5 - 5.1 mmol/L Final     Chloride   Date Value Ref Range Status   03/21/2025 100 98 - 107 mmol/L Final     CO2   Date Value Ref Range Status   03/21/2025 24 22 - 29 mmol/L Final     Glucose   Date Value Ref Range Status   02/07/2025 165 (H) 70 - 110 mg/dL Final     BUN   Date Value Ref Range Status   03/21/2025 21.1 8 - 23 mg/dL Final     Creatinine   Date Value Ref Range Status   03/21/2025 1.50 (H) 0.70 - 1.20 mg/dL Final     Comment:     Recommend repeat creatinine within 90 days.     Calcium   Date Value Ref Range Status   03/21/2025 9.4 8.6 - 10.2 mg/dL Final     Total Protein   Date Value Ref Range Status   02/07/2025 7.3 6.4 - 8.2 g/dL Final     Albumin   Date Value Ref Range Status   03/21/2025 4.1 3.5 - 5.2 g/dL Final     Total Bilirubin   Date Value Ref Range Status   02/07/2025 0.3 0.0 - 1.0 mg/dL Final     Alkaline Phosphatase   Date Value Ref Range Status   02/07/2025 68 46 - 116 U/L Final     AST   Date Value Ref Range Status   03/21/2025 24 0 - 40 U/L Final     ALT   Date Value Ref Range Status   02/07/2025 33 12 - 78 U/L Final     Anion Gap   Date Value Ref Range Status   03/21/2025 11.0 8.0 - 17.0 mmol/L  Final     Comment:     NOTE  Testing performed at:  [facility], [address] [place]  [address] CLIA #:[id number]       Assessment/Plan:      ECOG 1      Clear renal cell carcinoma arising from the left kidney:    == Status post left simple nephrectomy, pT3a with regional lymph node involvement on imaging but no distant metastatic disease.  Based on current guidelines for stage III clear renal cell carcinoma status post simple mastectomy, patient would need adjuvant pembrolizumab which is category 1 per NCCN guidelines.  In this regard I will recommend port placement and prior authorization request for adjuvant pembrolizumab.  ==08/22/24:  Patient here today to discuss upcoming chemotherapy/immunotherapy. An extensive discussion was had which included a thorough discussion of potential side effect profile, risk versus benefits, and expected outcomes.  Risks, including but not limited to, possible hair loss, bone marrow damage, damage to body organs, allergic reactions, sterility, nausea/vomiting, constipation/diarrhea, sores in the mouth, secondary cancers, and rarely death were all discuss.  Specific side effects pertaining to their chemotherapy/immunotherapy medications were discussed as well.  The patient agrees with the plan and all questions and their support systems questions have been answered to their satisfaction.  Premedications were prescribed and patient was educated on appropriate usage.  Patient was educated on when to call, and given the number to call, or to notify the provider including but not limited to:  Persistent nausea and vomiting, dehydration, fever greater than 100.4 lasting over 1 hour induration or isolated feeders greater than 101, rash while on active chemotherapy or immunotherapy, severe pain or new onset pain not controlled by current pain medication regimen.  ==10/07/24: Tolerating Keytruda well, glucose 195, pt has DMII and was not fasting, had coffee with sugar  prior to lab draw, no complaints, labs reviewed, pt cleared for Keytruda  ==11/18/24: TSH elevated, has always been low in past, will recheck and if again elevated will start on synthroid. He had cabg 3 weeks ago, reports doing well, no s/s infection or complications labs reviewed, pt is cleared for keytruda  ==12/30/24: TSH 32.86, patient has some occasional fatigue otherwise is asymptomatic. Not on any medication for hypothyroid, will start on 25mcg synthroid daily, educated on taking on empty stomach an hour prior to medications or food. May need titrate up in future, will recheck tsh next labs. Tolerating keytruda well, labs reviewed, pt cleraed for Keytruda  == 2/10/25: TSH 52.39,  Last CT scan was 10/24 will proceed with repeat imaging but will proceed with PET scan due to CKD stage 3a.   == 3/27/25: Improvement in TSH after he started taking the increased dose. Advised to be compliant with medications. Will continue with Keytruda.      2. HTN:  == Continue current medications and follow up closely.     3. CAD:   == Stable. Continue to follow up with PCP    4. DM with CKD   == On Glipizide  ==glucose nonfasting elevated, monitor as instructed per pcp and follow up with pcp  == elevated BS noted Fglu 165, worsening SrCr noted, advised to f/u with Pcp     5. Hypothyroidism  == Continue synthroid 50 mcg po daily    Plan:  Adjuvant keytruda for 1 year - start Monday 8/26/2024, 6 week dosing  RTC 6 weeks cbc cmp tsh prior  Synthroid 50 mcg daily on empty stomach  PET/CT, due to CKD in future will not proceed with CT scan    Future Appointments   Date Time Provider Department Center   4/11/2025  8:30 AM [facility] [address]   4/14/2025  8:20 AM Ra Mancini MD [facility] [address]         Total time spent in counseling and discussion about further management options including relevant lab work, treatment,  prognosis, medications and intended side effects was more than 60 minutes.  More than 50% of the time was spent on counseling and coordination of care.  I spent a total of 60 minutes on the day of the visit.This includes face to face time and non-face to face time preparing to see the patient (eg, review of tests), Obtaining and/or reviewing separately obtained history, Documenting clinical information in the electronic or other health record, Independently interpreting resultsand communicating results to the patient/family/caregiver, or Care coordination.

## 2025-04-14 ENCOUNTER — OFFICE VISIT (OUTPATIENT)
Dept: HEMATOLOGY/ONCOLOGY | Facility: CLINIC | Age: 60
End: 2025-04-14
Payer: COMMERCIAL

## 2025-04-14 VITALS
RESPIRATION RATE: 16 BRPM | DIASTOLIC BLOOD PRESSURE: 76 MMHG | SYSTOLIC BLOOD PRESSURE: 124 MMHG | WEIGHT: 269.69 LBS | HEIGHT: 70 IN | TEMPERATURE: 98 F | HEART RATE: 60 BPM | OXYGEN SATURATION: 97 % | BODY MASS INDEX: 38.61 KG/M2

## 2025-04-14 DIAGNOSIS — C64.2 RENAL CELL CARCINOMA OF LEFT KIDNEY: Primary | ICD-10-CM

## 2025-04-14 DIAGNOSIS — E03.2 HYPOTHYROIDISM DUE TO MEDICATION: ICD-10-CM

## 2025-04-14 PROCEDURE — 3008F BODY MASS INDEX DOCD: CPT | Mod: CPTII,,, | Performed by: INTERNAL MEDICINE

## 2025-04-14 PROCEDURE — 99214 OFFICE O/P EST MOD 30 MIN: CPT | Mod: S$PBB,,, | Performed by: INTERNAL MEDICINE

## 2025-04-14 PROCEDURE — 3074F SYST BP LT 130 MM HG: CPT | Mod: CPTII,,, | Performed by: INTERNAL MEDICINE

## 2025-04-14 PROCEDURE — 1159F MED LIST DOCD IN RCRD: CPT | Mod: CPTII,,, | Performed by: INTERNAL MEDICINE

## 2025-04-14 PROCEDURE — 3078F DIAST BP <80 MM HG: CPT | Mod: CPTII,,, | Performed by: INTERNAL MEDICINE

## 2025-04-14 PROCEDURE — 3051F HG A1C>EQUAL 7.0%<8.0%: CPT | Mod: CPTII,,, | Performed by: INTERNAL MEDICINE

## 2025-04-14 PROCEDURE — 1160F RVW MEDS BY RX/DR IN RCRD: CPT | Mod: CPTII,,, | Performed by: INTERNAL MEDICINE

## 2025-04-14 RX ORDER — LEVOTHYROXINE SODIUM 75 UG/1
75 TABLET ORAL
Qty: 30 TABLET | Refills: 11 | Status: SHIPPED | OUTPATIENT
Start: 2025-04-14 | End: 2026-04-14

## 2025-04-14 NOTE — PROGRESS NOTES
MEDICAL ONCOLOGY FOLLOW UP CONSULTATION NOTE    Patient ID: Cecil Martinez is a 59 y.o. male.    Chief Complaint:  Renal cell carcinoma    HPI:  Patient is a 58-year-old male with past medical history of hypertension, coronary artery disease, kidney stone, mixed hyperlipidemia, type 2 diabetes mellitus without complications who recently underwent left nephrectomy which showed findings consistent with clear cell renal cell carcinoma.  Please see detailed pathology report below.  Patient presents to our clinic today for further evaluation reports doing well after recent surgery.       Pathology:  07/22/2024    Left nephrectomy:    Tumor focality: Unifocal  Tumor size: 4.5 cm  Histologic type: Clear renal cell carcinoma  Histologic grade: Grade 4  Tumor extent: Extends into renal sinus  Histologic features: Rhabdoid features present  Tumor necrosis: Not identified  Lymphatic and or vascular invasion: Not identified  Margin status: All margins negative for invasive carcinoma  Pathologic staging: pT3a        Imaging:       MRI abdomen with and without contrast:  05/29/2024    Kidneys enhance symmetrically.  3.9 x 3.1 cm heterogeneously enhancing partially exophytic mass at the anterior aspect left mid pole consistent with malignancy.  Enlarged left para-aortic lymph node measuring 2 x 1.7 cm concerning for regional metastatic disease.    Heterogeneous enhancement in the left renal vein extending into the adjacent IVC which remains throughout the post-contrast acquisitions in his consistent for persistent nonocclusive thrombus.  Left renal artery appears patent.  Left renal stent was reportedly placed in the interval through this is poorly delineated on the current study.    2.3 cm nephrolith at the left UPJ with mild hydronephrosis.  Subcentimeter nonobstructing stone at the left inferior pole.  Subcentimeter simple renal cyst bilaterally.    Impression:  3.9 cm enhancing left renal mass with enlarged periaortic lymph  nodes suggestive of regional metastatic disease.  Nonocclusive thrombus in the left renal vein extending into the adjacent IVC.  Left renal stent was reportedly placed in the interim.  2.3 cm nephrolith at the left UPJ with mild hydronephrosis  Severe hepatic steatosis    Past Medical History:   Diagnosis Date    Clotting disorder     Essential (primary) hypertension     Heart attack 2015    Kidney cancer, primary, with metastasis from kidney to other site     Kidney stone     Mixed hyperlipidemia     Type 2 diabetes mellitus without complications      Family History   Problem Relation Name Age of Onset    Breast cancer Mother      Cancer Mother          Breast    No Known Problems Father      Kidney cancer Brother marjorie      Social History     Socioeconomic History    Marital status:    Tobacco Use    Smoking status: Former     Types: Cigarettes    Smokeless tobacco: Former   Substance and Sexual Activity    Alcohol use: Not Currently    Drug use: Never     Social Drivers of Health     Financial Resource Strain: Low Risk  (10/19/2024)    Received from [facility] and Its Subsidiaries and Affiliates    Overall Financial Resource Strain (CARDIA)     Difficulty of Paying Living Expenses: Not hard at all   Recent Concern: Financial Resource Strain - Medium Risk (8/7/2024)    Overall Financial Resource Strain (CARDIA)     Difficulty of Paying Living Expenses: Somewhat hard   Food Insecurity: No Food Insecurity (10/19/2024)    Received from [facility] and Its Subsidiaries and Affiliates    Hunger Vital Sign     Worried About Running Out of Food in the Last Year: Never true     Ran Out of Food in the Last Year: Never true   Recent Concern: Food Insecurity - Food Insecurity Present (8/7/2024)    Hunger Vital Sign     Worried About Running Out of Food in the Last Year: Sometimes true     Ran Out of Food in the Last Year: Never true    Transportation Needs: No Transportation Needs (10/19/2024)    Received from [facility] and Its Subsidiaries and Affiliates    PRAPARE - Transportation     Lack of Transportation (Medical): No     Lack of Transportation (Non-Medical): No   Physical Activity: Inactive (8/7/2024)    Exercise Vital Sign     Days of Exercise per Week: 0 days     Minutes of Exercise per Session: 0 min   Stress: No Stress Concern Present (8/7/2024)    [employer] - Occupational Stress Questionnaire     Feeling of Stress : Not at all   Housing Stability: Low Risk  (10/19/2024)    Received from [facility] and Its Subsidiaries and Affiliates    Housing Stability Vital Sign     Unable to Pay for Housing in the Last Year: No     Number of Times Moved in the Last Year: 0     Homeless in the Last Year: No     Past Surgical History:   Procedure Laterality Date    Left URS      open heart surgery  10/28/2024    RADICAL NEPHRECTOMY Left     Left-hand assisted laparoscopic    STENT, CAROTID W/ PROTECT      x 2         Review of systems:  Review of Systems   Constitutional:  Negative for activity change, appetite change, chills, diaphoresis, fatigue and unexpected weight change.   HENT:  Negative for congestion, facial swelling, hearing loss, mouth sores, trouble swallowing and voice change.    Eyes:  Negative for photophobia, pain, discharge and itching.   Respiratory:  Negative for apnea, cough, choking, chest tightness and shortness of breath.    Cardiovascular:  Negative for chest pain, palpitations and leg swelling.   Gastrointestinal:  Negative for abdominal distention, abdominal pain, anal bleeding and blood in stool.   Endocrine: Negative for cold intolerance, heat intolerance, polydipsia and polyphagia.   Genitourinary:  Negative for difficulty urinating, dysuria, flank pain and hematuria.   Musculoskeletal:  Negative for arthralgias,  back pain, joint swelling, myalgias, neck pain and neck stiffness.   Skin:  Negative for color change, pallor and wound.   Allergic/Immunologic: Negative for environmental allergies, food allergies and immunocompromised state.   Neurological:  Negative for dizziness, seizures, facial asymmetry, speech difficulty, light-headedness, numbness and headaches.   Hematological:  Negative for adenopathy. Does not bruise/bleed easily.   Psychiatric/Behavioral:  Negative for agitation, behavioral problems, confusion, decreased concentration and sleep disturbance.            Physical Exam  Vitals and nursing note reviewed.   Constitutional:       General: He is not in acute distress.     Appearance: Normal appearance. He is not ill-appearing.   HENT:      Head: Normocephalic and atraumatic.      Nose: No congestion or rhinorrhea.   Eyes:      General: No scleral icterus.     Extraocular Movements: Extraocular movements intact.      Pupils: Pupils are equal, round, and reactive to light.   Cardiovascular:      Rate and Rhythm: Normal rate and regular rhythm.      Pulses: Normal pulses.      Heart sounds: Normal heart sounds. No murmur heard.     No gallop.   Pulmonary:      Effort: Pulmonary effort is normal. No respiratory distress.      Breath sounds: Normal breath sounds. No stridor. No wheezing or rhonchi.   Abdominal:      General: Bowel sounds are normal. There is no distension.      Palpations: There is no mass.      Tenderness: There is no abdominal tenderness. There is no guarding.   Musculoskeletal:         General: No swelling, tenderness, deformity or signs of injury. Normal range of motion.      Cervical back: Normal range of motion and neck supple. No rigidity. No muscular tenderness.      Right lower leg: No edema.      Left lower leg: No edema.   Skin:     General: Skin is warm.      Coloration: Skin is not jaundiced or pale.      Findings: No bruising or lesion.   Neurological:      General: No focal deficit  present.      Mental Status: He is alert and oriented to person, place, and time.      Cranial Nerves: No cranial nerve deficit.      Sensory: No sensory deficit.      Motor: No weakness.      Gait: Gait normal.   Psychiatric:         Mood and Affect: Mood normal.         Behavior: Behavior normal.         Thought Content: Thought content normal.       Vitals:    04/14/25 0847   BP: 124/76   Pulse: 60   Resp: 16   Temp: 98.4 °F (36.9 °C)       Body surface area is 2.46 meters squared.      Lab Results   Component Value Date    WBC 8.88 04/11/2025    HGB 13.8 (L) 04/11/2025    HCT 40.8 (L) 04/11/2025    MCV 93.4 04/11/2025    LABPLAT 253 02/07/2025      CMP  Sodium   Date Value Ref Range Status   04/11/2025 140 136 - 145 mmol/L Final     Potassium   Date Value Ref Range Status   04/11/2025 4.5 3.5 - 5.1 mmol/L Final     Chloride   Date Value Ref Range Status   04/11/2025 105 98 - 107 mmol/L Final     CO2   Date Value Ref Range Status   04/11/2025 25 22 - 29 mmol/L Final     Glucose   Date Value Ref Range Status   02/07/2025 165 (H) 70 - 110 mg/dL Final     BUN   Date Value Ref Range Status   04/11/2025 20.8 8 - 23 mg/dL Final     Creatinine   Date Value Ref Range Status   04/11/2025 1.47 (H) 0.70 - 1.20 mg/dL Final     Comment:     Recommend repeat creatinine within 90 days.     Calcium   Date Value Ref Range Status   04/11/2025 9.4 8.6 - 10.2 mg/dL Final     Total Protein   Date Value Ref Range Status   02/07/2025 7.3 6.4 - 8.2 g/dL Final     Albumin   Date Value Ref Range Status   04/11/2025 4.0 3.5 - 5.2 g/dL Final     Total Bilirubin   Date Value Ref Range Status   02/07/2025 0.3 0.0 - 1.0 mg/dL Final     Alkaline Phosphatase   Date Value Ref Range Status   02/07/2025 68 46 - 116 U/L Final     AST   Date Value Ref Range Status   04/11/2025 24 0 - 40 U/L Final     ALT   Date Value Ref Range Status   02/07/2025 33 12 - 78 U/L Final     Anion Gap   Date Value Ref Range Status   04/11/2025 10.0 8.0 - 17.0 mmol/L  Final     Comment:     NOTE  Testing performed at:  [facility], [address] [place]  [address] CLIA #:[id number]       Assessment/Plan:      ECOG 1      Clear renal cell carcinoma arising from the left kidney:    == Status post left simple nephrectomy, pT3a with regional lymph node involvement on imaging but no distant metastatic disease.  Based on current guidelines for stage III clear renal cell carcinoma status post simple mastectomy, patient would need adjuvant pembrolizumab which is category 1 per NCCN guidelines.  In this regard I will recommend port placement and prior authorization request for adjuvant pembrolizumab.  ==08/22/24:  Patient here today to discuss upcoming chemotherapy/immunotherapy. An extensive discussion was had which included a thorough discussion of potential side effect profile, risk versus benefits, and expected outcomes.  Risks, including but not limited to, possible hair loss, bone marrow damage, damage to body organs, allergic reactions, sterility, nausea/vomiting, constipation/diarrhea, sores in the mouth, secondary cancers, and rarely death were all discuss.  Specific side effects pertaining to their chemotherapy/immunotherapy medications were discussed as well.  The patient agrees with the plan and all questions and their support systems questions have been answered to their satisfaction.  Premedications were prescribed and patient was educated on appropriate usage.  Patient was educated on when to call, and given the number to call, or to notify the provider including but not limited to:  Persistent nausea and vomiting, dehydration, fever greater than 100.4 lasting over 1 hour induration or isolated feeders greater than 101, rash while on active chemotherapy or immunotherapy, severe pain or new onset pain not controlled by current pain medication regimen.  ==10/07/24: Tolerating Keytruda well, glucose 195, pt has DMII and was not fasting, had coffee with sugar  prior to lab draw, no complaints, labs reviewed, pt cleared for Keytruda  ==11/18/24: TSH elevated, has always been low in past, will recheck and if again elevated will start on synthroid. He had cabg 3 weeks ago, reports doing well, no s/s infection or complications labs reviewed, pt is cleared for keytruda  ==12/30/24: TSH 32.86, patient has some occasional fatigue otherwise is asymptomatic. Not on any medication for hypothyroid, will start on 25mcg synthroid daily, educated on taking on empty stomach an hour prior to medications or food. May need titrate up in future, will recheck tsh next labs. Tolerating keytruda well, labs reviewed, pt cleraed for Keytruda  == 2/10/25: TSH 52.39,  Last CT scan was 10/24 will proceed with repeat imaging but will proceed with PET scan due to CKD stage 3a.   == 4/14/25: Improvement in TSH after he started taking the increased dose. His TSH is currently in 30s. I will increase dose of synthroid to 75 mcg.  Will continue with Keytruda q 6 weekly.      2. HTN:  == Continue current medications and follow up closely.     3. CAD:   == Stable. Continue to follow up with PCP    4. DM with CKD   == On Glipizide  ==glucose nonfasting elevated, monitor as instructed per pcp and follow up with pcp  == elevated BS noted Fglu 165, worsening SrCr noted, advised to f/u with Pcp     5. Hypothyroidism  == Continue synthroid 50 mcg po daily    Plan:  Adjuvant keytruda for 1 year - started Monday 8/26/2024, 6 week dosing  RTC 3 weeks cbc cmp tsh prior  Synthroid 75 mcg daily on empty stomach  PET/CT, due to CKD in future will not proceed with CT scan      Total time spent in counseling and discussion about further management options including relevant lab work, treatment,  prognosis, medications and intended side effects was more than 30 minutes. More than 50% of the time was spent on counseling and coordination of care.  I spent a total of 30 minutes on the day of the visit.This includes face to  face time and non-face to face time preparing to see the patient (eg, review of tests), Obtaining and/or reviewing separately obtained history, Documenting clinical information in the electronic or other health record, Independently interpreting resultsand communicating results to the patient/family/caregiver, or Care coordination.

## 2025-05-05 ENCOUNTER — OFFICE VISIT (OUTPATIENT)
Dept: HEMATOLOGY/ONCOLOGY | Facility: CLINIC | Age: 60
End: 2025-05-05
Payer: COMMERCIAL

## 2025-05-05 VITALS
OXYGEN SATURATION: 96 % | TEMPERATURE: 98 F | DIASTOLIC BLOOD PRESSURE: 78 MMHG | HEART RATE: 73 BPM | HEIGHT: 70 IN | BODY MASS INDEX: 39.05 KG/M2 | RESPIRATION RATE: 16 BRPM | SYSTOLIC BLOOD PRESSURE: 144 MMHG | WEIGHT: 272.81 LBS

## 2025-05-05 DIAGNOSIS — C64.2 RENAL CELL CARCINOMA OF LEFT KIDNEY: Primary | ICD-10-CM

## 2025-05-05 DIAGNOSIS — E03.2 HYPOTHYROIDISM DUE TO MEDICATION: ICD-10-CM

## 2025-05-05 PROCEDURE — 3051F HG A1C>EQUAL 7.0%<8.0%: CPT | Mod: CPTII,,, | Performed by: NURSE PRACTITIONER

## 2025-05-05 PROCEDURE — 3008F BODY MASS INDEX DOCD: CPT | Mod: CPTII,,, | Performed by: NURSE PRACTITIONER

## 2025-05-05 PROCEDURE — 3078F DIAST BP <80 MM HG: CPT | Mod: CPTII,,, | Performed by: NURSE PRACTITIONER

## 2025-05-05 PROCEDURE — G2211 COMPLEX E/M VISIT ADD ON: HCPCS | Mod: S$PBB,,, | Performed by: NURSE PRACTITIONER

## 2025-05-05 PROCEDURE — 99215 OFFICE O/P EST HI 40 MIN: CPT | Mod: S$PBB,,, | Performed by: NURSE PRACTITIONER

## 2025-05-05 PROCEDURE — 3077F SYST BP >= 140 MM HG: CPT | Mod: CPTII,,, | Performed by: NURSE PRACTITIONER

## 2025-05-05 PROCEDURE — 1159F MED LIST DOCD IN RCRD: CPT | Mod: CPTII,,, | Performed by: NURSE PRACTITIONER

## 2025-05-05 RX ORDER — SODIUM CHLORIDE 0.9 % (FLUSH) 0.9 %
10 SYRINGE (ML) INJECTION
Status: CANCELLED | OUTPATIENT
Start: 2025-05-05

## 2025-05-05 RX ORDER — EPINEPHRINE 0.3 MG/.3ML
0.3 INJECTION SUBCUTANEOUS ONCE AS NEEDED
Status: CANCELLED | OUTPATIENT
Start: 2025-05-05

## 2025-05-05 RX ORDER — PROCHLORPERAZINE EDISYLATE 5 MG/ML
10 INJECTION INTRAMUSCULAR; INTRAVENOUS ONCE AS NEEDED
Status: CANCELLED | OUTPATIENT
Start: 2025-05-05

## 2025-05-05 RX ORDER — DIPHENHYDRAMINE HYDROCHLORIDE 50 MG/ML
50 INJECTION, SOLUTION INTRAMUSCULAR; INTRAVENOUS ONCE AS NEEDED
Status: CANCELLED | OUTPATIENT
Start: 2025-05-05

## 2025-05-05 RX ORDER — HEPARIN 100 UNIT/ML
500 SYRINGE INTRAVENOUS
Status: CANCELLED | OUTPATIENT
Start: 2025-05-05

## 2025-05-05 NOTE — PROGRESS NOTES
MEDICAL ONCOLOGY FOLLOW UP CONSULTATION NOTE    Patient ID: Cecil Martinez is a 59 y.o. male.    Chief Complaint:  Renal cell carcinoma    HPI:  Patient is a 58-year-old male with past medical history of hypertension, coronary artery disease, kidney stone, mixed hyperlipidemia, type 2 diabetes mellitus without complications who recently underwent left nephrectomy which showed findings consistent with clear cell renal cell carcinoma.  Please see detailed pathology report below.  Patient presents to our clinic today for further evaluation reports doing well after recent surgery.       Pathology:  07/22/2024    Left nephrectomy:    Tumor focality: Unifocal  Tumor size: 4.5 cm  Histologic type: Clear renal cell carcinoma  Histologic grade: Grade 4  Tumor extent: Extends into renal sinus  Histologic features: Rhabdoid features present  Tumor necrosis: Not identified  Lymphatic and or vascular invasion: Not identified  Margin status: All margins negative for invasive carcinoma  Pathologic staging: pT3a        Imaging:       MRI abdomen with and without contrast:  05/29/2024    Kidneys enhance symmetrically.  3.9 x 3.1 cm heterogeneously enhancing partially exophytic mass at the anterior aspect left mid pole consistent with malignancy.  Enlarged left para-aortic lymph node measuring 2 x 1.7 cm concerning for regional metastatic disease.    Heterogeneous enhancement in the left renal vein extending into the adjacent IVC which remains throughout the post-contrast acquisitions in his consistent for persistent nonocclusive thrombus.  Left renal artery appears patent.  Left renal stent was reportedly placed in the interval through this is poorly delineated on the current study.    2.3 cm nephrolith at the left UPJ with mild hydronephrosis.  Subcentimeter nonobstructing stone at the left inferior pole.  Subcentimeter simple renal cyst bilaterally.    Impression:  3.9 cm enhancing left renal mass with enlarged periaortic lymph  nodes suggestive of regional metastatic disease.  Nonocclusive thrombus in the left renal vein extending into the adjacent IVC.  Left renal stent was reportedly placed in the interim.  2.3 cm nephrolith at the left UPJ with mild hydronephrosis  Severe hepatic steatosis    Past Medical History:   Diagnosis Date    Clotting disorder     Essential (primary) hypertension     Heart attack 2015    Kidney cancer, primary, with metastasis from kidney to other site     Kidney stone     Mixed hyperlipidemia     Type 2 diabetes mellitus without complications      Family History   Problem Relation Name Age of Onset    Breast cancer Mother      Cancer Mother          Breast    No Known Problems Father      Kidney cancer Brother marjorie      Social History     Socioeconomic History    Marital status:    Tobacco Use    Smoking status: Former     Types: Cigarettes    Smokeless tobacco: Former   Substance and Sexual Activity    Alcohol use: Not Currently    Drug use: Never     Social Drivers of Health     Financial Resource Strain: Low Risk  (10/19/2024)    Received from [facility] and [facility] Subsidiaries and Affiliates    Overall Financial Resource Strain (CARDIA)     Difficulty of Paying Living Expenses: Not hard at all   Recent Concern: Financial Resource Strain - Medium Risk (8/7/2024)    Overall Financial Resource Strain (CARDIA)     Difficulty of Paying Living Expenses: Somewhat hard   Food Insecurity: No Food Insecurity (10/19/2024)    Received from [facility] and Its Subsidiaries and Affiliates    Hunger Vital Sign     Worried About Running Out of Food in the Last Year: Never true     Ran Out of Food in the Last Year: Never true   Recent Concern: Food Insecurity - Food Insecurity Present (8/7/2024)    Hunger Vital Sign     Worried About Running Out of Food in the Last Year: Sometimes true     Ran Out of Food in the Last Year: Never true    Transportation Needs: No Transportation Needs (10/19/2024)    Received from [facility] and Its Subsidiaries and Affiliates    PRAPARE - Transportation     Lack of Transportation (Medical): No     Lack of Transportation (Non-Medical): No   Physical Activity: Inactive (8/7/2024)    Exercise Vital Sign     Days of Exercise per Week: 0 days     Minutes of Exercise per Session: 0 min   Stress: No Stress Concern Present (8/7/2024)    [employer] - Occupational Stress Questionnaire     Feeling of Stress : Not at all   Housing Stability: Low Risk  (10/19/2024)    Received from [facility] and Its Subsidiaries and Affiliates    Housing Stability Vital Sign     Unable to Pay for Housing in the Last Year: No     Number of Times Moved in the Last Year: 0     Homeless in the Last Year: No     Past Surgical History:   Procedure Laterality Date    Left URS      open heart surgery  10/28/2024    RADICAL NEPHRECTOMY Left     Left-hand assisted laparoscopic    STENT, CAROTID W/ PROTECT      x 2         Review of systems:  Review of Systems   Constitutional:  Negative for activity change, appetite change, chills, diaphoresis, fatigue and unexpected weight change.   HENT:  Negative for congestion, facial swelling, hearing loss, mouth sores, trouble swallowing and voice change.    Eyes:  Negative for photophobia, pain, discharge and itching.   Respiratory:  Negative for apnea, cough, choking, chest tightness and shortness of breath.    Cardiovascular:  Negative for chest pain, palpitations and leg swelling.   Gastrointestinal:  Negative for abdominal distention, abdominal pain, anal bleeding and blood in stool.   Endocrine: Negative for cold intolerance, heat intolerance, polydipsia and polyphagia.   Genitourinary:  Negative for difficulty urinating, dysuria, flank pain and hematuria.   Musculoskeletal:  Negative for arthralgias,  back pain, joint swelling, myalgias, neck pain and neck stiffness.   Skin:  Negative for color change, pallor and wound.   Allergic/Immunologic: Negative for environmental allergies, food allergies and immunocompromised state.   Neurological:  Negative for dizziness, seizures, facial asymmetry, speech difficulty, light-headedness, numbness and headaches.   Hematological:  Negative for adenopathy. Does not bruise/bleed easily.   Psychiatric/Behavioral:  Negative for agitation, behavioral problems, confusion, decreased concentration and sleep disturbance.            Physical Exam  Vitals and nursing note reviewed.   Constitutional:       General: He is not in acute distress.     Appearance: Normal appearance. He is not ill-appearing.   HENT:      Head: Normocephalic and atraumatic.      Nose: No congestion or rhinorrhea.   Eyes:      General: No scleral icterus.     Extraocular Movements: Extraocular movements intact.      Pupils: Pupils are equal, round, and reactive to light.   Cardiovascular:      Rate and Rhythm: Normal rate and regular rhythm.      Pulses: Normal pulses.      Heart sounds: Normal heart sounds. No murmur heard.     No gallop.   Pulmonary:      Effort: Pulmonary effort is normal. No respiratory distress.      Breath sounds: Normal breath sounds. No stridor. No wheezing or rhonchi.   Abdominal:      General: Bowel sounds are normal. There is no distension.      Palpations: There is no mass.      Tenderness: There is no abdominal tenderness. There is no guarding.   Musculoskeletal:         General: No swelling, tenderness, deformity or signs of injury. Normal range of motion.      Cervical back: Normal range of motion and neck supple. No rigidity. No muscular tenderness.      Right lower leg: No edema.      Left lower leg: No edema.   Skin:     General: Skin is warm.      Coloration: Skin is not jaundiced or pale.      Findings: No bruising or lesion.   Neurological:      General: No focal deficit  present.      Mental Status: He is alert and oriented to person, place, and time.      Cranial Nerves: No cranial nerve deficit.      Sensory: No sensory deficit.      Motor: No weakness.      Gait: Gait normal.   Psychiatric:         Mood and Affect: Mood normal.         Behavior: Behavior normal.         Thought Content: Thought content normal.       Vitals:    05/05/25 0858   BP: (!) 144/78   Pulse: 73   Resp: 16   Temp: 98 °F (36.7 °C)       Body surface area is 2.47 meters squared.      Lab Results   Component Value Date    WBC 8.32 05/01/2025    HGB 13.2 (L) 05/01/2025    HCT 39.0 (L) 05/01/2025    MCV 95.8 (H) 05/01/2025    LABPLAT 253 02/07/2025      CMP  Sodium   Date Value Ref Range Status   05/01/2025 136 136 - 145 mmol/L Final     Potassium   Date Value Ref Range Status   05/01/2025 4.4 3.5 - 5.1 mmol/L Final     Chloride   Date Value Ref Range Status   05/01/2025 101 98 - 107 mmol/L Final     CO2   Date Value Ref Range Status   05/01/2025 23 22 - 29 mmol/L Final     Glucose   Date Value Ref Range Status   02/07/2025 165 (H) 70 - 110 mg/dL Final     BUN   Date Value Ref Range Status   05/01/2025 20.0 8 - 23 mg/dL Final     Creatinine   Date Value Ref Range Status   05/01/2025 1.29 (H) 0.70 - 1.20 mg/dL Final     Comment:     Recommend repeat creatinine within 90 days.     Calcium   Date Value Ref Range Status   05/01/2025 9.2 8.6 - 10.2 mg/dL Final     Total Protein   Date Value Ref Range Status   02/07/2025 7.3 6.4 - 8.2 g/dL Final     Albumin   Date Value Ref Range Status   05/01/2025 3.9 3.5 - 5.2 g/dL Final     Total Bilirubin   Date Value Ref Range Status   02/07/2025 0.3 0.0 - 1.0 mg/dL Final     Alkaline Phosphatase   Date Value Ref Range Status   02/07/2025 68 46 - 116 U/L Final     AST   Date Value Ref Range Status   05/01/2025 17 0 - 40 U/L Final     ALT   Date Value Ref Range Status   02/07/2025 33 12 - 78 U/L Final     Anion Gap   Date Value Ref Range Status   05/01/2025 12.0 8.0 - 17.0  mmol/L Final     Comment:     NOTE  Testing performed at:  [facility], [address] [place]  [address] CLIA #:[id number]       Assessment/Plan:      ECOG 1      Clear renal cell carcinoma arising from the left kidney:    == Status post left simple nephrectomy, pT3a with regional lymph node involvement on imaging but no distant metastatic disease.  Based on current guidelines for stage III clear renal cell carcinoma status post simple mastectomy, patient would need adjuvant pembrolizumab which is category 1 per NCCN guidelines.  In this regard I will recommend port placement and prior authorization request for adjuvant pembrolizumab.  ==08/22/24:  Patient here today to discuss upcoming chemotherapy/immunotherapy. An extensive discussion was had which included a thorough discussion of potential side effect profile, risk versus benefits, and expected outcomes.  Risks, including but not limited to, possible hair loss, bone marrow damage, damage to body organs, allergic reactions, sterility, nausea/vomiting, constipation/diarrhea, sores in the mouth, secondary cancers, and rarely death were all discuss.  Specific side effects pertaining to their chemotherapy/immunotherapy medications were discussed as well.  The patient agrees with the plan and all questions and their support systems questions have been answered to their satisfaction.  Premedications were prescribed and patient was educated on appropriate usage.  Patient was educated on when to call, and given the number to call, or to notify the provider including but not limited to:  Persistent nausea and vomiting, dehydration, fever greater than 100.4 lasting over 1 hour induration or isolated feeders greater than 101, rash while on active chemotherapy or immunotherapy, severe pain or new onset pain not controlled by current pain medication regimen.  ==10/07/24: Tolerating Keytruda well, glucose 195, pt has DMII and was not fasting, had coffee  with sugar prior to lab draw, no complaints, labs reviewed, pt cleared for Keytruda  ==11/18/24: TSH elevated, has always been low in past, will recheck and if again elevated will start on synthroid. He had cabg 3 weeks ago, reports doing well, no s/s infection or complications labs reviewed, pt is cleared for keytruda  ==12/30/24: TSH 32.86, patient has some occasional fatigue otherwise is asymptomatic. Not on any medication for hypothyroid, will start on 25mcg synthroid daily, educated on taking on empty stomach an hour prior to medications or food. May need titrate up in future, will recheck tsh next labs. Tolerating keytruda well, labs reviewed, pt cleraed for Keytruda  == 2/10/25: TSH 52.39,  Last CT scan was 10/24 will proceed with repeat imaging but will proceed with PET scan due to CKD stage 3a.   == 5/5/25: labs reviewed, Will continue with Keytruda q 6 weekly. Instructed on correct administration of synthroid. If no improvement at next visit will refer to Endocrinology      2. HTN:  == Continue current medications and follow up closely.     3. CAD:   == Stable. Continue to follow up with PCP    4. DM with CKD   == On Glipizide  ==glucose nonfasting elevated, monitor as instructed per pcp and follow up with pcp  == elevated BS noted Fglu 165, worsening SrCr noted, advised to f/u with Pcp     5. Hypothyroidism  == Continue synthroid 75 mcg, instructed to take alone, no other meds food or drink for 1 hour.     Plan:  Adjuvant keytruda for 1 year - started Monday 8/26/2024, 6 week dosing  RTC 3 weeks cbc cmp tsh prior  Synthroid 75 mcg daily on empty stomach  PET/CT, due to CKD in future will not proceed with CT scan      Total time spent in counseling and discussion about further management options including relevant lab work, treatment,  prognosis, medications and intended side effects was more than 30 minutes. More than 50% of the time was spent on counseling and coordination of care.  I spent a total of 30  minutes on the day of the visit.This includes face to face time and non-face to face time preparing to see the patient (eg, review of tests), Obtaining and/or reviewing separately obtained history, Documenting clinical information in the electronic or other health record, Independently interpreting resultsand communicating results to the patient/family/caregiver, or Care coordination.

## 2025-06-16 ENCOUNTER — OFFICE VISIT (OUTPATIENT)
Dept: HEMATOLOGY/ONCOLOGY | Facility: CLINIC | Age: 60
End: 2025-06-16
Payer: COMMERCIAL

## 2025-06-16 VITALS
OXYGEN SATURATION: 95 % | TEMPERATURE: 99 F | HEART RATE: 86 BPM | SYSTOLIC BLOOD PRESSURE: 130 MMHG | BODY MASS INDEX: 38.17 KG/M2 | WEIGHT: 266.63 LBS | RESPIRATION RATE: 16 BRPM | DIASTOLIC BLOOD PRESSURE: 82 MMHG | HEIGHT: 70 IN

## 2025-06-16 DIAGNOSIS — R79.89 ELEVATED SERUM CREATININE: ICD-10-CM

## 2025-06-16 DIAGNOSIS — C64.2 RENAL CELL CARCINOMA OF LEFT KIDNEY: Primary | ICD-10-CM

## 2025-06-16 DIAGNOSIS — N18.31 DIABETES MELLITUS DUE TO UNDERLYING CONDITION WITH STAGE 3A CHRONIC KIDNEY DISEASE, WITHOUT LONG-TERM CURRENT USE OF INSULIN: ICD-10-CM

## 2025-06-16 DIAGNOSIS — E08.22 DIABETES MELLITUS DUE TO UNDERLYING CONDITION WITH STAGE 3A CHRONIC KIDNEY DISEASE, WITHOUT LONG-TERM CURRENT USE OF INSULIN: ICD-10-CM

## 2025-06-16 DIAGNOSIS — E03.9 HYPOTHYROIDISM, UNSPECIFIED TYPE: ICD-10-CM

## 2025-06-16 RX ORDER — DIPHENHYDRAMINE HYDROCHLORIDE 50 MG/ML
50 INJECTION, SOLUTION INTRAMUSCULAR; INTRAVENOUS ONCE AS NEEDED
Status: CANCELLED | OUTPATIENT
Start: 2025-06-16

## 2025-06-16 RX ORDER — HEPARIN 100 UNIT/ML
500 SYRINGE INTRAVENOUS
Status: CANCELLED | OUTPATIENT
Start: 2025-06-16

## 2025-06-16 RX ORDER — SODIUM CHLORIDE 0.9 % (FLUSH) 0.9 %
10 SYRINGE (ML) INJECTION
Status: CANCELLED | OUTPATIENT
Start: 2025-06-16

## 2025-06-16 RX ORDER — PROCHLORPERAZINE EDISYLATE 5 MG/ML
10 INJECTION INTRAMUSCULAR; INTRAVENOUS ONCE AS NEEDED
Status: CANCELLED | OUTPATIENT
Start: 2025-06-16

## 2025-06-16 RX ORDER — EPINEPHRINE 0.3 MG/.3ML
0.3 INJECTION SUBCUTANEOUS ONCE AS NEEDED
Status: CANCELLED | OUTPATIENT
Start: 2025-06-16

## 2025-06-16 NOTE — PROGRESS NOTES
MEDICAL ONCOLOGY FOLLOW UP CONSULTATION NOTE    Patient ID: Cecil Martinez is a 59 y.o. male.    Chief Complaint:  Renal cell carcinoma    HPI:  Patient is a 58-year-old male with past medical history of hypertension, coronary artery disease, kidney stone, mixed hyperlipidemia, type 2 diabetes mellitus without complications who recently underwent left nephrectomy which showed findings consistent with clear cell renal cell carcinoma.  Please see detailed pathology report below.  Patient presents to our clinic today for further evaluation reports doing well after recent surgery.       Pathology:  07/22/2024    Left nephrectomy:    Tumor focality: Unifocal  Tumor size: 4.5 cm  Histologic type: Clear renal cell carcinoma  Histologic grade: Grade 4  Tumor extent: Extends into renal sinus  Histologic features: Rhabdoid features present  Tumor necrosis: Not identified  Lymphatic and or vascular invasion: Not identified  Margin status: All margins negative for invasive carcinoma  Pathologic staging: pT3a        Imaging:       MRI abdomen with and without contrast:  05/29/2024    Kidneys enhance symmetrically.  3.9 x 3.1 cm heterogeneously enhancing partially exophytic mass at the anterior aspect left mid pole consistent with malignancy.  Enlarged left para-aortic lymph node measuring 2 x 1.7 cm concerning for regional metastatic disease.    Heterogeneous enhancement in the left renal vein extending into the adjacent IVC which remains throughout the post-contrast acquisitions in his consistent for persistent nonocclusive thrombus.  Left renal artery appears patent.  Left renal stent was reportedly placed in the interval through this is poorly delineated on the current study.    2.3 cm nephrolith at the left UPJ with mild hydronephrosis.  Subcentimeter nonobstructing stone at the left inferior pole.  Subcentimeter simple renal cyst bilaterally.    Impression:  3.9 cm enhancing left renal mass with enlarged periaortic lymph  nodes suggestive of regional metastatic disease.  Nonocclusive thrombus in the left renal vein extending into the adjacent IVC.  Left renal stent was reportedly placed in the interim.  2.3 cm nephrolith at the left UPJ with mild hydronephrosis  Severe hepatic steatosis    Past Medical History:   Diagnosis Date    Clotting disorder     Essential (primary) hypertension     Heart attack 2015    Kidney cancer, primary, with metastasis from kidney to other site     Kidney stone     Mixed hyperlipidemia     Type 2 diabetes mellitus without complications      Family History   Problem Relation Name Age of Onset    Breast cancer Mother      Cancer Mother          Breast    No Known Problems Father      Kidney cancer Brother marjorie      Social History     Socioeconomic History    Marital status:    Tobacco Use    Smoking status: Former     Types: Cigarettes    Smokeless tobacco: Former   Substance and Sexual Activity    Alcohol use: Not Currently    Drug use: Never     Social Drivers of Health     Financial Resource Strain: Low Risk  (10/19/2024)    Received from [facility] and Its Subsidiaries and Affiliates    Overall Financial Resource Strain (CARDIA)     Difficulty of Paying Living Expenses: Not hard at all   Recent Concern: Financial Resource Strain - Medium Risk (8/7/2024)    Overall Financial Resource Strain (CARDIA)     Difficulty of Paying Living Expenses: Somewhat hard   Food Insecurity: No Food Insecurity (10/19/2024)    [facility] from [facility] Subsidiaries and Affiliates    Hunger Vital Sign     Worried About Running Out of Food in the Last Year: Never true     Ran Out of Food in the Last Year: Never true   Recent Concern: Food Insecurity - Food Insecurity Present (8/7/2024)    Hunger Vital Sign     Worried About Running Out of Food in the Last Year: Sometimes true     Ran Out of Food in the Last Year: Never true    Transportation Needs: No Transportation Needs (10/19/2024)    Received from [facility] and Its Subsidiaries and Affiliates    PRAPARE - Transportation     Lack of Transportation (Medical): No     Lack of Transportation (Non-Medical): No   Physical Activity: Inactive (8/7/2024)    Exercise Vital Sign     Days of Exercise per Week: 0 days     Minutes of Exercise per Session: 0 min   Stress: No Stress Concern Present (8/7/2024)    [employer] [place] [employer] - Occupational Stress Questionnaire     Feeling of Stress : Not at all   Housing Stability: Low Risk  (10/19/2024)    Received from [facility] and Its Subsidiaries and Affiliates    Housing Stability Vital Sign     Unable to Pay for Housing in the Last Year: No     Number of Times Moved in the Last Year: 0     Homeless in the Last Year: No     Past Surgical History:   Procedure Laterality Date    Left URS      open heart surgery  10/28/2024    RADICAL NEPHRECTOMY Left     Left-hand assisted laparoscopic    STENT, CAROTID W/ PROTECT      x 2         Review of systems:  Review of Systems   Constitutional:  Negative for activity change, appetite change, chills, diaphoresis, fatigue and unexpected weight change.   HENT:  Negative for congestion, facial swelling, hearing loss, mouth sores, trouble swallowing and voice change.    Eyes:  Negative for photophobia, pain, discharge and itching.   Respiratory:  Negative for apnea, cough, choking, chest tightness and shortness of breath.    Cardiovascular:  Negative for chest pain, palpitations and leg swelling.   Gastrointestinal:  Negative for abdominal distention, abdominal pain, anal bleeding and blood in stool.   Endocrine: Negative for cold intolerance, heat intolerance, polydipsia and polyphagia.   Genitourinary:  Negative for difficulty urinating, dysuria, flank pain and hematuria.   Musculoskeletal:  Negative for arthralgias,  back pain, joint swelling, myalgias, neck pain and neck stiffness.   Skin:  Negative for color change, pallor and wound.   Allergic/Immunologic: Negative for environmental allergies, food allergies and immunocompromised state.   Neurological:  Negative for dizziness, seizures, facial asymmetry, speech difficulty, light-headedness, numbness and headaches.   Hematological:  Negative for adenopathy. Does not bruise/bleed easily.   Psychiatric/Behavioral:  Negative for agitation, behavioral problems, confusion, decreased concentration and sleep disturbance.            Physical Exam  Vitals and nursing note reviewed.   Constitutional:       General: He is not in acute distress.     Appearance: Normal appearance. He is not ill-appearing.   HENT:      Head: Normocephalic and atraumatic.      Nose: No congestion or rhinorrhea.   Eyes:      General: No scleral icterus.     Extraocular Movements: Extraocular movements intact.      Pupils: Pupils are equal, round, and reactive to light.   Cardiovascular:      Rate and Rhythm: Normal rate and regular rhythm.      Pulses: Normal pulses.      Heart sounds: Normal heart sounds. No murmur heard.     No gallop.   Pulmonary:      Effort: Pulmonary effort is normal. No respiratory distress.      Breath sounds: Normal breath sounds. No stridor. No wheezing or rhonchi.   Abdominal:      General: Bowel sounds are normal. There is no distension.      Palpations: There is no mass.      Tenderness: There is no abdominal tenderness. There is no guarding.   Musculoskeletal:         General: No swelling, tenderness, deformity or signs of injury. Normal range of motion.      Cervical back: Normal range of motion and neck supple. No rigidity. No muscular tenderness.      Right lower leg: No edema.      Left lower leg: No edema.   Skin:     General: Skin is warm.      Coloration: Skin is not jaundiced or pale.      Findings: No bruising or lesion.   Neurological:      General: No focal deficit  present.      Mental Status: He is alert and oriented to person, place, and time.      Cranial Nerves: No cranial nerve deficit.      Sensory: No sensory deficit.      Motor: No weakness.      Gait: Gait normal.   Psychiatric:         Mood and Affect: Mood normal.         Behavior: Behavior normal.         Thought Content: Thought content normal.       Vitals:    06/16/25 0827   BP: 130/82   Pulse: 86   Resp: 16   Temp: 98.6 °F (37 °C)       Body surface area is 2.44 meters squared.      Lab Results   Component Value Date    WBC 7.08 06/13/2025    HGB 13.3 (L) 06/13/2025    HCT 38.9 (L) 06/13/2025    MCV 94.6 (H) 06/13/2025    LABPLAT 253 02/07/2025      CMP  Sodium   Date Value Ref Range Status   06/13/2025 134 (L) 136 - 145 mmol/L Final     Potassium   Date Value Ref Range Status   06/13/2025 4.7 3.5 - 5.1 mmol/L Final     Chloride   Date Value Ref Range Status   06/13/2025 99 98 - 107 mmol/L Final     CO2   Date Value Ref Range Status   06/13/2025 26 22 - 29 mmol/L Final     Glucose   Date Value Ref Range Status   02/07/2025 165 (H) 70 - 110 mg/dL Final     BUN   Date Value Ref Range Status   06/13/2025 21.4 8 - 23 mg/dL Final     Creatinine   Date Value Ref Range Status   06/13/2025 1.38 (H) 0.70 - 1.20 mg/dL Final     Comment:     Recommend repeat creatinine within 90 days.     Calcium   Date Value Ref Range Status   06/13/2025 8.9 8.6 - 10.2 mg/dL Final     Total Protein   Date Value Ref Range Status   02/07/2025 7.3 6.4 - 8.2 g/dL Final     Albumin   Date Value Ref Range Status   06/13/2025 4.0 3.5 - 5.2 g/dL Final     Total Bilirubin   Date Value Ref Range Status   02/07/2025 0.3 0.0 - 1.0 mg/dL Final     Alkaline Phosphatase   Date Value Ref Range Status   02/07/2025 68 46 - 116 U/L Final     AST   Date Value Ref Range Status   06/13/2025 15 0 - 40 U/L Final     ALT   Date Value Ref Range Status   02/07/2025 33 12 - 78 U/L Final     Anion Gap   Date Value Ref Range Status   06/13/2025 9.0 8.0 - 17.0 mmol/L  Final     Comment:     NOTE  Testing performed at:  [facility], [address] [place]  [address] CLIA #:[id number]       Assessment/Plan:      ECOG 1      Clear renal cell carcinoma arising from the left kidney:    == Status post left simple nephrectomy, pT3a with regional lymph node involvement on imaging but no distant metastatic disease.  Based on current guidelines for stage III clear renal cell carcinoma status post simple mastectomy, patient would need adjuvant pembrolizumab which is category 1 per NCCN guidelines.  In this regard I will recommend port placement and prior authorization request for adjuvant pembrolizumab.  ==08/22/24:  Patient here today to discuss upcoming chemotherapy/immunotherapy. An extensive discussion was had which included a thorough discussion of potential side effect profile, risk versus benefits, and expected outcomes.  Risks, including but not limited to, possible hair loss, bone marrow damage, damage to body organs, allergic reactions, sterility, nausea/vomiting, constipation/diarrhea, sores in the mouth, secondary cancers, and rarely death were all discuss.  Specific side effects pertaining to their chemotherapy/immunotherapy medications were discussed as well.  The patient agrees with the plan and all questions and their support systems questions have been answered to their satisfaction.  Premedications were prescribed and patient was educated on appropriate usage.  Patient was educated on when to call, and given the number to call, or to notify the provider including but not limited to:  Persistent nausea and vomiting, dehydration, fever greater than 100.4 lasting over 1 hour induration or isolated feeders greater than 101, rash while on active chemotherapy or immunotherapy, severe pain or new onset pain not controlled by current pain medication regimen.  ==10/07/24: Tolerating Keytruda well, glucose 195, pt has DMII and was not fasting, had coffee with sugar  prior to lab draw, no complaints, labs reviewed, pt cleared for Keytruda  ==11/18/24: TSH elevated, has always been low in past, will recheck and if again elevated will start on synthroid. He had cabg 3 weeks ago, reports doing well, no s/s infection or complications labs reviewed, pt is cleared for keytruda  ==12/30/24: TSH 32.86, patient has some occasional fatigue otherwise is asymptomatic. Not on any medication for hypothyroid, will start on 25mcg synthroid daily, educated on taking on empty stomach an hour prior to medications or food. May need titrate up in future, will recheck tsh next labs. Tolerating keytruda well, labs reviewed, pt cleraed for Keytruda  == 2/10/25: TSH 52.39,  Last CT scan was 10/24 will proceed with repeat imaging but will proceed with PET scan due to CKD stage 3a.   == 5/5/25: labs reviewed, Will continue with Keytruda q 6 weekly. Instructed on correct administration of synthroid. If no improvement at next visit will refer to Endocrinology  == 6/16/25: TSH much improved, ok to continue with IO as planned, next tx is last tx.       2. HTN:  == Continue current medications and follow up closely.     3. CAD:   == Stable. Continue to follow up with PCP    4. DM with CKD   == On Glipizide and mounjaro   ==glucose nonfasting elevated, monitor as instructed per pcp and follow up with pcp  == elevated BS noted non fasting 348,  worsening SrCr noted, advised to f/u with Pcp     5. Hypothyroidism  == Continue synthroid 75 mcg, instructed to take alone, no other meds food or drink for 1 hour.     Plan:  Adjuvant keytruda for 1 year - started Monday 8/26/2024, 6 week dosing  RTC 3 weeks cbc cmp tsh prior  Synthroid 75 mcg daily on empty stomach  PET/CT, due to CKD in future will not proceed with CT scan  F/u with PCP for blood sugar control     Total time spent in counseling and discussion about further management options including relevant lab work, treatment,  prognosis, medications and intended  side effects was more than 40 minutes. More than 50% of the time was spent on counseling and coordination of care.  I spent a total of 40 minutes on the day of the visit.This includes face to face time and non-face to face time preparing to see the patient (eg, review of tests), Obtaining and/or reviewing separately obtained history, Documenting clinical information in the electronic or other health record, Independently interpreting resultsand communicating results to the patient/family/caregiver, or Care coordination.

## 2025-07-28 ENCOUNTER — OFFICE VISIT (OUTPATIENT)
Dept: HEMATOLOGY/ONCOLOGY | Facility: CLINIC | Age: 60
End: 2025-07-28
Payer: COMMERCIAL

## 2025-07-28 VITALS
SYSTOLIC BLOOD PRESSURE: 109 MMHG | RESPIRATION RATE: 16 BRPM | DIASTOLIC BLOOD PRESSURE: 75 MMHG | OXYGEN SATURATION: 95 % | WEIGHT: 263.63 LBS | HEART RATE: 75 BPM | HEIGHT: 70 IN | TEMPERATURE: 98 F | BODY MASS INDEX: 37.74 KG/M2

## 2025-07-28 DIAGNOSIS — N18.31 DIABETES MELLITUS DUE TO UNDERLYING CONDITION WITH STAGE 3A CHRONIC KIDNEY DISEASE, WITHOUT LONG-TERM CURRENT USE OF INSULIN: ICD-10-CM

## 2025-07-28 DIAGNOSIS — E08.22 DIABETES MELLITUS DUE TO UNDERLYING CONDITION WITH STAGE 3A CHRONIC KIDNEY DISEASE, WITHOUT LONG-TERM CURRENT USE OF INSULIN: ICD-10-CM

## 2025-07-28 DIAGNOSIS — C64.2 RENAL CELL CARCINOMA OF LEFT KIDNEY: Primary | ICD-10-CM

## 2025-07-28 PROCEDURE — 3008F BODY MASS INDEX DOCD: CPT | Mod: CPTII,,, | Performed by: NURSE PRACTITIONER

## 2025-07-28 PROCEDURE — 3051F HG A1C>EQUAL 7.0%<8.0%: CPT | Mod: CPTII,,, | Performed by: NURSE PRACTITIONER

## 2025-07-28 PROCEDURE — 99215 OFFICE O/P EST HI 40 MIN: CPT | Mod: S$PBB,,, | Performed by: NURSE PRACTITIONER

## 2025-07-28 PROCEDURE — 3074F SYST BP LT 130 MM HG: CPT | Mod: CPTII,,, | Performed by: NURSE PRACTITIONER

## 2025-07-28 PROCEDURE — G2211 COMPLEX E/M VISIT ADD ON: HCPCS | Mod: S$PBB,,, | Performed by: NURSE PRACTITIONER

## 2025-07-28 PROCEDURE — 1159F MED LIST DOCD IN RCRD: CPT | Mod: CPTII,,, | Performed by: NURSE PRACTITIONER

## 2025-07-28 PROCEDURE — 3078F DIAST BP <80 MM HG: CPT | Mod: CPTII,,, | Performed by: NURSE PRACTITIONER

## 2025-07-28 RX ORDER — SODIUM CHLORIDE 0.9 % (FLUSH) 0.9 %
10 SYRINGE (ML) INJECTION
Status: CANCELLED | OUTPATIENT
Start: 2025-07-28

## 2025-07-28 RX ORDER — HEPARIN 100 UNIT/ML
500 SYRINGE INTRAVENOUS
Status: CANCELLED | OUTPATIENT
Start: 2025-07-28

## 2025-07-28 RX ORDER — DIPHENHYDRAMINE HYDROCHLORIDE 50 MG/ML
50 INJECTION, SOLUTION INTRAMUSCULAR; INTRAVENOUS ONCE AS NEEDED
Status: CANCELLED | OUTPATIENT
Start: 2025-07-28

## 2025-07-28 RX ORDER — PROCHLORPERAZINE EDISYLATE 5 MG/ML
10 INJECTION INTRAMUSCULAR; INTRAVENOUS ONCE AS NEEDED
Status: CANCELLED | OUTPATIENT
Start: 2025-07-28

## 2025-07-28 RX ORDER — EPINEPHRINE 0.3 MG/.3ML
0.3 INJECTION SUBCUTANEOUS ONCE AS NEEDED
Status: CANCELLED | OUTPATIENT
Start: 2025-07-28

## 2025-07-28 NOTE — PROGRESS NOTES
MEDICAL ONCOLOGY FOLLOW UP CONSULTATION NOTE    Patient ID: Cecil Martinez is a 59 y.o. male.    Chief Complaint:  Renal cell carcinoma    HPI:  Patient is a 58-year-old male with past medical history of hypertension, coronary artery disease, kidney stone, mixed hyperlipidemia, type 2 diabetes mellitus without complications who recently underwent left nephrectomy which showed findings consistent with clear cell renal cell carcinoma.  Please see detailed pathology report below.  Patient presents to our clinic today for further evaluation reports doing well after recent surgery.       Pathology:  07/22/2024    Left nephrectomy:    Tumor focality: Unifocal  Tumor size: 4.5 cm  Histologic type: Clear renal cell carcinoma  Histologic grade: Grade 4  Tumor extent: Extends into renal sinus  Histologic features: Rhabdoid features present  Tumor necrosis: Not identified  Lymphatic and or vascular invasion: Not identified  Margin status: All margins negative for invasive carcinoma  Pathologic staging: pT3a        Imaging:       MRI abdomen with and without contrast:  05/29/2024    Kidneys enhance symmetrically.  3.9 x 3.1 cm heterogeneously enhancing partially exophytic mass at the anterior aspect left mid pole consistent with malignancy.  Enlarged left para-aortic lymph node measuring 2 x 1.7 cm concerning for regional metastatic disease.    Heterogeneous enhancement in the left renal vein extending into the adjacent IVC which remains throughout the post-contrast acquisitions in his consistent for persistent nonocclusive thrombus.  Left renal artery appears patent.  Left renal stent was reportedly placed in the interval through this is poorly delineated on the current study.    2.3 cm nephrolith at the left UPJ with mild hydronephrosis.  Subcentimeter nonobstructing stone at the left inferior pole.  Subcentimeter simple renal cyst bilaterally.    Impression:  3.9 cm enhancing left renal mass with enlarged periaortic lymph  nodes suggestive of regional metastatic disease.  Nonocclusive thrombus in the left renal vein extending into the adjacent IVC.  Left renal stent was reportedly placed in the interim.  2.3 cm nephrolith at the left UPJ with mild hydronephrosis  Severe hepatic steatosis    Past Medical History:   Diagnosis Date    Clotting disorder     Essential (primary) hypertension     Heart attack 2015    Kidney cancer, primary, with metastasis from kidney to other site     Kidney stone     Mixed hyperlipidemia     Type 2 diabetes mellitus without complications      Family History   Problem Relation Name Age of Onset    Breast cancer Mother      Cancer Mother          Breast    No Known Problems Father      Kidney cancer Brother marjorie      Social History     Socioeconomic History    Marital status:    Tobacco Use    Smoking status: Former     Types: Cigarettes    Smokeless tobacco: Former   Substance and Sexual Activity    Alcohol use: Not Currently    Drug use: Never     Social Drivers of Health     Financial Resource Strain: Low Risk  (10/19/2024)    Received from [facility] and Its Subsidiaries and Affiliates    Overall Financial Resource Strain (CARDIA)     Difficulty of Paying Living Expenses: Not hard at all   Recent Concern: Financial Resource Strain - Medium Risk (8/7/2024)    Overall Financial Resource Strain (CARDIA)     Difficulty of Paying Living Expenses: Somewhat hard   Food Insecurity: No Food Insecurity (10/19/2024)    Received from [facility] and Its Subsidiaries and Affiliates    Hunger Vital Sign     Worried About Running Out of Food in the Last Year: Never true     Ran Out of Food in the Last Year: Never true   Recent Concern: Food Insecurity - Food Insecurity Present (8/7/2024)    Hunger Vital Sign     Worried About Running Out of Food in the Last Year: Sometimes true     Ran Out of Food in the Last Year: Never true    Transportation Needs: No Transportation Needs (10/19/2024)    Received from [facility] and Its Subsidiaries and Affiliates    PRAPARE - Transportation     Lack of Transportation (Medical): No     Lack of Transportation (Non-Medical): No   Physical Activity: Inactive (8/7/2024)    Exercise Vital Sign     Days of Exercise per Week: 0 days     Minutes of Exercise per Session: 0 min   Stress: No Stress Concern Present (8/7/2024)    [employer] - Occupational Stress Questionnaire     Feeling of Stress : Not at all   Housing Stability: Low Risk  (10/19/2024)    Received from [facility] and Its Subsidiaries and Affiliates    Housing Stability Vital Sign     Unable to Pay for Housing in the Last Year: No     Number of Times Moved in the Last Year: 0     Homeless in the Last Year: No     Past Surgical History:   Procedure Laterality Date    Left URS      open heart surgery  10/28/2024    RADICAL NEPHRECTOMY Left     Left-hand assisted laparoscopic    STENT, CAROTID W/ PROTECT      x 2         Review of systems:  Review of Systems   Constitutional:  Negative for activity change, appetite change, chills, diaphoresis, fatigue and unexpected weight change.   HENT:  Negative for congestion, facial swelling, hearing loss, mouth sores, trouble swallowing and voice change.    Eyes:  Negative for photophobia, pain, discharge and itching.   Respiratory:  Negative for apnea, cough, choking, chest tightness and shortness of breath.    Cardiovascular:  Negative for chest pain, palpitations and leg swelling.   Gastrointestinal:  Negative for abdominal distention, abdominal pain, anal bleeding and blood in stool.   Endocrine: Negative for cold intolerance, heat intolerance, polydipsia and polyphagia.   Genitourinary:  Negative for difficulty urinating, dysuria, flank pain and hematuria.   Musculoskeletal:  Negative for arthralgias,  back pain, joint swelling, myalgias, neck pain and neck stiffness.   Skin:  Negative for color change, pallor and wound.   Allergic/Immunologic: Negative for environmental allergies, food allergies and immunocompromised state.   Neurological:  Negative for dizziness, seizures, facial asymmetry, speech difficulty, light-headedness, numbness and headaches.   Hematological:  Negative for adenopathy. Does not bruise/bleed easily.   Psychiatric/Behavioral:  Negative for agitation, behavioral problems, confusion, decreased concentration and sleep disturbance.            Physical Exam  Vitals and nursing note reviewed.   Constitutional:       General: He is not in acute distress.     Appearance: Normal appearance. He is not ill-appearing.   HENT:      Head: Normocephalic and atraumatic.      Nose: No congestion or rhinorrhea.   Eyes:      General: No scleral icterus.     Extraocular Movements: Extraocular movements intact.      Pupils: Pupils are equal, round, and reactive to light.   Cardiovascular:      Rate and Rhythm: Normal rate and regular rhythm.      Pulses: Normal pulses.      Heart sounds: Normal heart sounds. No murmur heard.     No gallop.   Pulmonary:      Effort: Pulmonary effort is normal. No respiratory distress.      Breath sounds: Normal breath sounds. No stridor. No wheezing or rhonchi.   Abdominal:      General: Bowel sounds are normal. There is no distension.      Palpations: There is no mass.      Tenderness: There is no abdominal tenderness. There is no guarding.   Musculoskeletal:         General: No swelling, tenderness, deformity or signs of injury. Normal range of motion.      Cervical back: Normal range of motion and neck supple. No rigidity. No muscular tenderness.      Right lower leg: No edema.      Left lower leg: No edema.   Skin:     General: Skin is warm.      Coloration: Skin is not jaundiced or pale.      Findings: No bruising or lesion.   Neurological:      General: No focal deficit  present.      Mental Status: He is alert and oriented to person, place, and time.      Cranial Nerves: No cranial nerve deficit.      Sensory: No sensory deficit.      Motor: No weakness.      Gait: Gait normal.   Psychiatric:         Mood and Affect: Mood normal.         Behavior: Behavior normal.         Thought Content: Thought content normal.       Vitals:    07/28/25 1009   BP: 109/75   Pulse: 75   Resp: 16   Temp: 98.3 °F (36.8 °C)       Body surface area is 2.43 meters squared.      Lab Results   Component Value Date    WBC 7.28 07/25/2025    HGB 13.4 (L) 07/25/2025    HCT 39.3 (L) 07/25/2025    MCV 93.8 07/25/2025    LABPLAT 253 02/07/2025      CMP  Sodium   Date Value Ref Range Status   07/25/2025 135 (L) 136 - 145 mmol/L Final     Potassium   Date Value Ref Range Status   07/25/2025 4.7 3.5 - 5.1 mmol/L Final     Chloride   Date Value Ref Range Status   07/25/2025 100 98 - 107 mmol/L Final     CO2   Date Value Ref Range Status   07/25/2025 22 22 - 29 mmol/L Final     Glucose   Date Value Ref Range Status   02/07/2025 165 (H) 70 - 110 mg/dL Final     BUN   Date Value Ref Range Status   07/25/2025 26.3 (H) 8 - 23 mg/dL Final     Creatinine   Date Value Ref Range Status   07/25/2025 1.57 (H) 0.70 - 1.20 mg/dL Final     Comment:     Recommend repeat creatinine within 90 days.     Calcium   Date Value Ref Range Status   07/25/2025 9.3 8.6 - 10.2 mg/dL Final     Total Protein   Date Value Ref Range Status   02/07/2025 7.3 6.4 - 8.2 g/dL Final     Albumin   Date Value Ref Range Status   07/25/2025 3.9 3.5 - 5.2 g/dL Final     Total Bilirubin   Date Value Ref Range Status   02/07/2025 0.3 0.0 - 1.0 mg/dL Final     Alkaline Phosphatase   Date Value Ref Range Status   02/07/2025 68 46 - 116 U/L Final     AST   Date Value Ref Range Status   07/25/2025 15 0 - 40 U/L Final     ALT   Date Value Ref Range Status   02/07/2025 33 12 - 78 U/L Final     Anion Gap   Date Value Ref Range Status   07/25/2025 13.0 8.0 - 17.0  mmol/L Final     Comment:     NOTE  Testing performed at:  [facility], [address] [place]  [address] CLIA #:[id number]       Assessment/Plan:      ECOG 1      Clear renal cell carcinoma arising from the left kidney:    == Status post left simple nephrectomy, pT3a with regional lymph node involvement on imaging but no distant metastatic disease.  Based on current guidelines for stage III clear renal cell carcinoma status post simple mastectomy, patient would need adjuvant pembrolizumab which is category 1 per NCCN guidelines.  In this regard I will recommend port placement and prior authorization request for adjuvant pembrolizumab.  ==08/22/24:  Patient here today to discuss upcoming chemotherapy/immunotherapy. An extensive discussion was had which included a thorough discussion of potential side effect profile, risk versus benefits, and expected outcomes.  Risks, including but not limited to, possible hair loss, bone marrow damage, damage to body organs, allergic reactions, sterility, nausea/vomiting, constipation/diarrhea, sores in the mouth, secondary cancers, and rarely death were all discuss.  Specific side effects pertaining to their chemotherapy/immunotherapy medications were discussed as well.  The patient agrees with the plan and all questions and their support systems questions have been answered to their satisfaction.  Premedications were prescribed and patient was educated on appropriate usage.  Patient was educated on when to call, and given the number to call, or to notify the provider including but not limited to:  Persistent nausea and vomiting, dehydration, fever greater than 100.4 lasting over 1 hour induration or isolated feeders greater than 101, rash while on active chemotherapy or immunotherapy, severe pain or new onset pain not controlled by current pain medication regimen.  ==10/07/24: Tolerating Keytruda well, glucose 195, pt has DMII and was not fasting, had coffee  with sugar prior to lab draw, no complaints, labs reviewed, pt cleared for Keytruda  ==11/18/24: TSH elevated, has always been low in past, will recheck and if again elevated will start on synthroid. He had cabg 3 weeks ago, reports doing well, no s/s infection or complications labs reviewed, pt is cleared for keytruda  ==12/30/24: TSH 32.86, patient has some occasional fatigue otherwise is asymptomatic. Not on any medication for hypothyroid, will start on 25mcg synthroid daily, educated on taking on empty stomach an hour prior to medications or food. May need titrate up in future, will recheck tsh next labs. Tolerating keytruda well, labs reviewed, pt cleraed for Keytruda  == 2/10/25: TSH 52.39,  Last CT scan was 10/24 will proceed with repeat imaging but will proceed with PET scan due to CKD stage 3a.   == 5/5/25: labs reviewed, Will continue with Keytruda q 6 weekly. Instructed on correct administration of synthroid. If no improvement at next visit will refer to Endocrinology  == 6/16/25: TSH much improved, ok to continue with IO as planned, next tx is last tx.       2. HTN:  == Continue current medications and follow up closely.     3. CAD:   == Stable. Continue to follow up with PCP    4. DM with CKD   == On Glipizide and mounjaro   ==glucose nonfasting elevated, monitor as instructed per pcp and follow up with pcp  == elevated BS noted non fasting 348,  worsening SrCr noted, advised to f/u with Pcp       5. Hypothyroidism  == Continue synthroid 75 mcg, instructed to take alone, no other meds food or drink for 1 hour.     Plan:  Adjuvant keytruda for 1 year - started Monday 8/26/2024, 6 week dosing  RTC 4 weeks cbc cmp tsh prior  Synthroid 75 mcg daily on empty stomach  PET/CT, due to CKD in future will not proceed with CT scan  F/u with PCP for blood sugar control     Total time spent in counseling and discussion about further management options including relevant lab work, treatment,  prognosis, medications  and intended side effects was more than 40 minutes. More than 50% of the time was spent on counseling and coordination of care.  I spent a total of 40 minutes on the day of the visit.This includes face to face time and non-face to face time preparing to see the patient (eg, review of tests), Obtaining and/or reviewing separately obtained history, Documenting clinical information in the electronic or other health record, Independently interpreting resultsand communicating results to the patient/family/caregiver, or Care coordination.

## 2025-08-18 ENCOUNTER — TELEPHONE (OUTPATIENT)
Dept: HEMATOLOGY/ONCOLOGY | Facility: CLINIC | Age: 60
End: 2025-08-18
Payer: COMMERCIAL

## 2025-09-02 ENCOUNTER — OFFICE VISIT (OUTPATIENT)
Dept: HEMATOLOGY/ONCOLOGY | Facility: CLINIC | Age: 60
End: 2025-09-02
Payer: COMMERCIAL

## 2025-09-02 VITALS
RESPIRATION RATE: 16 BRPM | BODY MASS INDEX: 38.67 KG/M2 | DIASTOLIC BLOOD PRESSURE: 85 MMHG | OXYGEN SATURATION: 94 % | HEIGHT: 70 IN | SYSTOLIC BLOOD PRESSURE: 137 MMHG | WEIGHT: 270.13 LBS | HEART RATE: 82 BPM

## 2025-09-02 DIAGNOSIS — E03.9 HYPOTHYROIDISM, UNSPECIFIED TYPE: ICD-10-CM

## 2025-09-02 DIAGNOSIS — R79.89 ELEVATED SERUM CREATININE: ICD-10-CM

## 2025-09-02 DIAGNOSIS — C64.2 RENAL CELL CARCINOMA OF LEFT KIDNEY: Primary | ICD-10-CM

## 2025-09-02 PROCEDURE — 3051F HG A1C>EQUAL 7.0%<8.0%: CPT | Mod: CPTII,S$GLB,, | Performed by: NURSE PRACTITIONER

## 2025-09-02 PROCEDURE — 3008F BODY MASS INDEX DOCD: CPT | Mod: CPTII,S$GLB,, | Performed by: NURSE PRACTITIONER

## 2025-09-02 PROCEDURE — 1159F MED LIST DOCD IN RCRD: CPT | Mod: CPTII,S$GLB,, | Performed by: NURSE PRACTITIONER

## 2025-09-02 PROCEDURE — 3075F SYST BP GE 130 - 139MM HG: CPT | Mod: CPTII,S$GLB,, | Performed by: NURSE PRACTITIONER

## 2025-09-02 PROCEDURE — 3079F DIAST BP 80-89 MM HG: CPT | Mod: CPTII,S$GLB,, | Performed by: NURSE PRACTITIONER

## 2025-09-02 PROCEDURE — 99215 OFFICE O/P EST HI 40 MIN: CPT | Mod: S$GLB,,, | Performed by: NURSE PRACTITIONER

## 2025-09-02 RX ORDER — SODIUM CHLORIDE 0.9 % (FLUSH) 0.9 %
10 SYRINGE (ML) INJECTION
OUTPATIENT
Start: 2025-09-02

## 2025-09-02 RX ORDER — HEPARIN 100 UNIT/ML
500 SYRINGE INTRAVENOUS
OUTPATIENT
Start: 2025-09-02